# Patient Record
Sex: FEMALE | URBAN - METROPOLITAN AREA
[De-identification: names, ages, dates, MRNs, and addresses within clinical notes are randomized per-mention and may not be internally consistent; named-entity substitution may affect disease eponyms.]

---

## 2017-01-09 ENCOUNTER — OUTPATIENT (OUTPATIENT)
Dept: OUTPATIENT SERVICES | Facility: HOSPITAL | Age: 32
LOS: 1 days | End: 2017-01-09
Payer: COMMERCIAL

## 2017-01-09 VITALS
TEMPERATURE: 99 F | HEART RATE: 79 BPM | DIASTOLIC BLOOD PRESSURE: 86 MMHG | SYSTOLIC BLOOD PRESSURE: 143 MMHG | WEIGHT: 293 LBS | HEIGHT: 63 IN | RESPIRATION RATE: 16 BRPM

## 2017-01-09 DIAGNOSIS — N84.0 POLYP OF CORPUS UTERI: ICD-10-CM

## 2017-01-09 DIAGNOSIS — Z01.818 ENCOUNTER FOR OTHER PREPROCEDURAL EXAMINATION: ICD-10-CM

## 2017-01-09 LAB
ALBUMIN SERPL ELPH-MCNC: 3.8 G/DL — SIGNIFICANT CHANGE UP (ref 3.3–5)
ALP SERPL-CCNC: 63 U/L — SIGNIFICANT CHANGE UP (ref 40–120)
ALT FLD-CCNC: 34 U/L — SIGNIFICANT CHANGE UP (ref 12–78)
ANION GAP SERPL CALC-SCNC: 8 MMOL/L — SIGNIFICANT CHANGE UP (ref 5–17)
AST SERPL-CCNC: 15 U/L — SIGNIFICANT CHANGE UP (ref 15–37)
BILIRUB SERPL-MCNC: 0.2 MG/DL — SIGNIFICANT CHANGE UP (ref 0.2–1.2)
BUN SERPL-MCNC: 13 MG/DL — SIGNIFICANT CHANGE UP (ref 7–23)
CALCIUM SERPL-MCNC: 8.6 MG/DL — SIGNIFICANT CHANGE UP (ref 8.5–10.1)
CHLORIDE SERPL-SCNC: 105 MMOL/L — SIGNIFICANT CHANGE UP (ref 96–108)
CO2 SERPL-SCNC: 27 MMOL/L — SIGNIFICANT CHANGE UP (ref 22–31)
CREAT SERPL-MCNC: 0.79 MG/DL — SIGNIFICANT CHANGE UP (ref 0.5–1.3)
GLUCOSE SERPL-MCNC: 99 MG/DL — SIGNIFICANT CHANGE UP (ref 70–99)
HCG SERPL-ACNC: <1 MIU/ML — SIGNIFICANT CHANGE UP
HCT VFR BLD CALC: 38.3 % — SIGNIFICANT CHANGE UP (ref 34.5–45)
HGB BLD-MCNC: 12.8 G/DL — SIGNIFICANT CHANGE UP (ref 11.5–15.5)
MCHC RBC-ENTMCNC: 28.1 PG — SIGNIFICANT CHANGE UP (ref 27–34)
MCHC RBC-ENTMCNC: 33.5 GM/DL — SIGNIFICANT CHANGE UP (ref 32–36)
MCV RBC AUTO: 83.9 FL — SIGNIFICANT CHANGE UP (ref 80–100)
PLATELET # BLD AUTO: 312 K/UL — SIGNIFICANT CHANGE UP (ref 150–400)
POTASSIUM SERPL-MCNC: 4.2 MMOL/L — SIGNIFICANT CHANGE UP (ref 3.5–5.3)
POTASSIUM SERPL-SCNC: 4.2 MMOL/L — SIGNIFICANT CHANGE UP (ref 3.5–5.3)
PROT SERPL-MCNC: 7.5 G/DL — SIGNIFICANT CHANGE UP (ref 6–8.3)
RBC # BLD: 4.56 M/UL — SIGNIFICANT CHANGE UP (ref 3.8–5.2)
RBC # FLD: 12.5 % — SIGNIFICANT CHANGE UP (ref 10.3–14.5)
SODIUM SERPL-SCNC: 140 MMOL/L — SIGNIFICANT CHANGE UP (ref 135–145)
WBC # BLD: 6.8 K/UL — SIGNIFICANT CHANGE UP (ref 3.8–10.5)
WBC # FLD AUTO: 6.8 K/UL — SIGNIFICANT CHANGE UP (ref 3.8–10.5)

## 2017-01-09 PROCEDURE — 86901 BLOOD TYPING SEROLOGIC RH(D): CPT

## 2017-01-09 PROCEDURE — G0463: CPT

## 2017-01-09 PROCEDURE — 84702 CHORIONIC GONADOTROPIN TEST: CPT

## 2017-01-09 PROCEDURE — 93010 ELECTROCARDIOGRAM REPORT: CPT

## 2017-01-09 PROCEDURE — 80053 COMPREHEN METABOLIC PANEL: CPT

## 2017-01-09 PROCEDURE — 86900 BLOOD TYPING SEROLOGIC ABO: CPT

## 2017-01-09 PROCEDURE — 93005 ELECTROCARDIOGRAM TRACING: CPT

## 2017-01-09 PROCEDURE — 85027 COMPLETE CBC AUTOMATED: CPT

## 2017-01-09 PROCEDURE — 86850 RBC ANTIBODY SCREEN: CPT

## 2017-01-09 RX ORDER — ACETAMINOPHEN 500 MG
975 TABLET ORAL ONCE
Qty: 0 | Refills: 0 | Status: COMPLETED | OUTPATIENT
Start: 2017-01-10 | End: 2017-01-10

## 2017-01-09 RX ORDER — SODIUM CHLORIDE 9 MG/ML
1000 INJECTION, SOLUTION INTRAVENOUS
Qty: 0 | Refills: 0 | Status: DISCONTINUED | OUTPATIENT
Start: 2017-01-10 | End: 2017-01-10

## 2017-01-09 NOTE — H&P PST ADULT - NSANTHOSAYNRD_GEN_A_CORE
No. DEVONTE screening performed.  STOP BANG Legend: 0-2 = LOW Risk; 3-4 = INTERMEDIATE Risk; 5-8 = HIGH Risk

## 2017-01-09 NOTE — H&P PST ADULT - PROBLEM SELECTOR PLAN 1
PST Labs; CBC, CMP, HcG, Type & Screen, EKG - No medical clearance needed - pre-op instructions given to pt with understanding verbalized PST Labs; CBC, CMP, HcG, Type & Screen, EKG - No medical clearance needed - pre-op instructions given to pt with understanding verbalized- pt also instructed to HOLD evening dose of Metformin night prior to procedure as well as to HOLD AM dose of Metformin morning of procedure with understanding verbalized

## 2017-01-09 NOTE — H&P PST ADULT - HISTORY OF PRESENT ILLNESS
31 year old female G0PO presents for PST prior to  Hysteroscopic Polypectomy Using Myosure with Dr Vaughn on 1/10/17 - pt notes she is trying to get pregnant - has been seen by another fertility doctor and notes she went thru 8 rounds of IUI's with no pregancies - pt notes she went for a consult with Dr Vaughn and will be starting IVF next month - pt notes following sonogram Cervical Polyp was diagnosed - pt is electing for scheduled procedure.

## 2017-01-09 NOTE — H&P PST ADULT - ASSESSMENT
31 year old female with Polyp of Corpus Uteri - scheduled for Hysteroscopic Polypectomy Using Myosure with Dr Vaughn on 1/10/17

## 2017-01-10 ENCOUNTER — OUTPATIENT (OUTPATIENT)
Dept: OUTPATIENT SERVICES | Facility: HOSPITAL | Age: 32
LOS: 1 days | Discharge: HOME | End: 2017-01-10
Payer: COMMERCIAL

## 2017-01-10 VITALS
SYSTOLIC BLOOD PRESSURE: 165 MMHG | HEIGHT: 63 IN | HEART RATE: 85 BPM | RESPIRATION RATE: 16 BRPM | TEMPERATURE: 98 F | WEIGHT: 293 LBS | DIASTOLIC BLOOD PRESSURE: 79 MMHG | OXYGEN SATURATION: 100 %

## 2017-01-10 VITALS
DIASTOLIC BLOOD PRESSURE: 91 MMHG | RESPIRATION RATE: 13 BRPM | HEART RATE: 84 BPM | SYSTOLIC BLOOD PRESSURE: 154 MMHG | OXYGEN SATURATION: 100 %

## 2017-01-10 DIAGNOSIS — N84.0 POLYP OF CORPUS UTERI: ICD-10-CM

## 2017-01-10 DIAGNOSIS — Z01.818 ENCOUNTER FOR OTHER PREPROCEDURAL EXAMINATION: ICD-10-CM

## 2017-01-10 PROCEDURE — 88305 TISSUE EXAM BY PATHOLOGIST: CPT

## 2017-01-10 PROCEDURE — 58558 HYSTEROSCOPY BIOPSY: CPT

## 2017-01-10 PROCEDURE — 88305 TISSUE EXAM BY PATHOLOGIST: CPT | Mod: 26

## 2017-01-10 RX ORDER — METFORMIN HYDROCHLORIDE 850 MG/1
2 TABLET ORAL
Qty: 0 | Refills: 0 | COMMUNITY

## 2017-01-10 RX ORDER — SODIUM CHLORIDE 9 MG/ML
1000 INJECTION, SOLUTION INTRAVENOUS
Qty: 0 | Refills: 0 | Status: DISCONTINUED | OUTPATIENT
Start: 2017-01-10 | End: 2017-01-10

## 2017-01-10 RX ORDER — ONDANSETRON 8 MG/1
4 TABLET, FILM COATED ORAL ONCE
Qty: 0 | Refills: 0 | Status: DISCONTINUED | OUTPATIENT
Start: 2017-01-10 | End: 2017-01-10

## 2017-01-10 RX ORDER — LEVOTHYROXINE SODIUM 125 MCG
1 TABLET ORAL
Qty: 0 | Refills: 0 | COMMUNITY

## 2017-01-10 RX ORDER — ALBUTEROL 90 UG/1
2 AEROSOL, METERED ORAL
Qty: 0 | Refills: 0 | COMMUNITY

## 2017-01-10 RX ORDER — HYDROMORPHONE HYDROCHLORIDE 2 MG/ML
0.25 INJECTION INTRAMUSCULAR; INTRAVENOUS; SUBCUTANEOUS
Qty: 0 | Refills: 0 | Status: DISCONTINUED | OUTPATIENT
Start: 2017-01-10 | End: 2017-01-10

## 2017-01-10 RX ADMIN — SODIUM CHLORIDE 30 MILLILITER(S): 9 INJECTION, SOLUTION INTRAVENOUS at 12:11

## 2017-01-10 RX ADMIN — Medication 975 MILLIGRAM(S): at 12:11

## 2017-01-10 NOTE — ASU DISCHARGE PLAN (ADULT/PEDIATRIC). - FOLLOWUP APPOINTMENT CLINIC/PHYSICIAN
Return to [place] office in 1 week for post-op check and review of Pathology findings: [phone]

## 2017-01-10 NOTE — ASU DISCHARGE PLAN (ADULT/PEDIATRIC). - NOTIFY
Pain not relieved by Medications/Persistent Nausea and Vomiting/Bleeding that does not stop/Unable to Urinate/GYN Fever>100.4/Inability to Tolerate Liquids or Foods

## 2017-01-12 LAB — SURGICAL PATHOLOGY FINAL REPORT - CH: SIGNIFICANT CHANGE UP

## 2017-01-16 DIAGNOSIS — N84.0 POLYP OF CORPUS UTERI: ICD-10-CM

## 2017-01-16 DIAGNOSIS — I87.8 OTHER SPECIFIED DISORDERS OF VEINS: ICD-10-CM

## 2017-01-16 DIAGNOSIS — E66.01 MORBID (SEVERE) OBESITY DUE TO EXCESS CALORIES: ICD-10-CM

## 2017-01-16 DIAGNOSIS — F17.210 NICOTINE DEPENDENCE, CIGARETTES, UNCOMPLICATED: ICD-10-CM

## 2017-01-16 DIAGNOSIS — E28.2 POLYCYSTIC OVARIAN SYNDROME: ICD-10-CM

## 2017-09-26 ENCOUNTER — TRANSCRIPTION ENCOUNTER (OUTPATIENT)
Age: 32
End: 2017-09-26

## 2017-11-28 ENCOUNTER — TRANSCRIPTION ENCOUNTER (OUTPATIENT)
Age: 32
End: 2017-11-28

## 2020-02-10 ENCOUNTER — TRANSCRIPTION ENCOUNTER (OUTPATIENT)
Age: 35
End: 2020-02-10

## 2022-01-26 NOTE — BRIEF OPERATIVE NOTE - SPECIMENS
Myosure curettings Blood Cx (1/11) grew serratia- on IV Zosyn- continue for 4 weeks  CT A/P showing splenic abscess 4cm in size - see section below for management

## 2022-06-28 ENCOUNTER — RESULT REVIEW (OUTPATIENT)
Age: 37
End: 2022-06-28

## 2022-06-28 ENCOUNTER — INPATIENT (INPATIENT)
Facility: HOSPITAL | Age: 37
LOS: 1 days | Discharge: HOME | DRG: 189 | End: 2022-06-30
Attending: GENERAL ACUTE CARE HOSPITAL | Admitting: GENERAL ACUTE CARE HOSPITAL
Payer: MEDICAID

## 2022-06-28 VITALS
TEMPERATURE: 100 F | SYSTOLIC BLOOD PRESSURE: 130 MMHG | OXYGEN SATURATION: 91 % | HEART RATE: 103 BPM | RESPIRATION RATE: 20 BRPM | WEIGHT: 293 LBS | HEIGHT: 64 IN | DIASTOLIC BLOOD PRESSURE: 115 MMHG

## 2022-06-28 DIAGNOSIS — J90 PLEURAL EFFUSION, NOT ELSEWHERE CLASSIFIED: ICD-10-CM

## 2022-06-28 DIAGNOSIS — C50.919 MALIGNANT NEOPLASM OF UNSPECIFIED SITE OF UNSPECIFIED FEMALE BREAST: ICD-10-CM

## 2022-06-28 LAB
ALBUMIN SERPL ELPH-MCNC: 3.5 G/DL — SIGNIFICANT CHANGE UP (ref 3.3–5.2)
ALP SERPL-CCNC: 74 U/L — SIGNIFICANT CHANGE UP (ref 40–120)
ALT FLD-CCNC: 36 U/L — HIGH
ANION GAP SERPL CALC-SCNC: 10 MMOL/L — SIGNIFICANT CHANGE UP (ref 5–17)
APTT BLD: 29.3 SEC — SIGNIFICANT CHANGE UP (ref 27.5–35.5)
AST SERPL-CCNC: 27 U/L — SIGNIFICANT CHANGE UP
B PERT IGG+IGM PNL SER: ABNORMAL
BASOPHILS # BLD AUTO: 0.06 K/UL — SIGNIFICANT CHANGE UP (ref 0–0.2)
BASOPHILS NFR BLD AUTO: 0.6 % — SIGNIFICANT CHANGE UP (ref 0–2)
BILIRUB SERPL-MCNC: <0.2 MG/DL — LOW (ref 0.4–2)
BLD GP AB SCN SERPL QL: SIGNIFICANT CHANGE UP
BUN SERPL-MCNC: 14.4 MG/DL — SIGNIFICANT CHANGE UP (ref 8–20)
CALCIUM SERPL-MCNC: 9 MG/DL — SIGNIFICANT CHANGE UP (ref 8.6–10.2)
CHLORIDE SERPL-SCNC: 100 MMOL/L — SIGNIFICANT CHANGE UP (ref 98–107)
CO2 SERPL-SCNC: 28 MMOL/L — SIGNIFICANT CHANGE UP (ref 22–29)
COLOR FLD: ABNORMAL
CREAT SERPL-MCNC: 0.8 MG/DL — SIGNIFICANT CHANGE UP (ref 0.5–1.3)
EGFR: 97 ML/MIN/1.73M2 — SIGNIFICANT CHANGE UP
EOSINOPHIL # BLD AUTO: 0.07 K/UL — SIGNIFICANT CHANGE UP (ref 0–0.5)
EOSINOPHIL NFR BLD AUTO: 0.7 % — SIGNIFICANT CHANGE UP (ref 0–6)
FLUAV AG NPH QL: SIGNIFICANT CHANGE UP
FLUBV AG NPH QL: SIGNIFICANT CHANGE UP
FLUID INTAKE SUBSTANCE CLASS: SIGNIFICANT CHANGE UP
GLUCOSE SERPL-MCNC: 104 MG/DL — HIGH (ref 70–99)
GRAM STN FLD: SIGNIFICANT CHANGE UP
HCT VFR BLD CALC: 35.2 % — SIGNIFICANT CHANGE UP (ref 34.5–45)
HGB BLD-MCNC: 10.6 G/DL — LOW (ref 11.5–15.5)
IMM GRANULOCYTES NFR BLD AUTO: 0.6 % — SIGNIFICANT CHANGE UP (ref 0–1.5)
INR BLD: 1.14 RATIO — SIGNIFICANT CHANGE UP (ref 0.88–1.16)
LYMPHOCYTES # BLD AUTO: 0.73 K/UL — LOW (ref 1–3.3)
LYMPHOCYTES # BLD AUTO: 7.1 % — LOW (ref 13–44)
LYMPHOCYTES # FLD: 13 % — SIGNIFICANT CHANGE UP
MCHC RBC-ENTMCNC: 25.8 PG — LOW (ref 27–34)
MCHC RBC-ENTMCNC: 30.1 GM/DL — LOW (ref 32–36)
MCV RBC AUTO: 85.6 FL — SIGNIFICANT CHANGE UP (ref 80–100)
MONOCYTES # BLD AUTO: 0.6 K/UL — SIGNIFICANT CHANGE UP (ref 0–0.9)
MONOCYTES NFR BLD AUTO: 5.8 % — SIGNIFICANT CHANGE UP (ref 2–14)
MONOS+MACROS # FLD: 5 % — SIGNIFICANT CHANGE UP
NEUTROPHILS # BLD AUTO: 8.82 K/UL — HIGH (ref 1.8–7.4)
NEUTROPHILS NFR BLD AUTO: 85.2 % — HIGH (ref 43–77)
NEUTROPHILS-BODY FLUID: 82 % — SIGNIFICANT CHANGE UP
PH FLD: 8 — SIGNIFICANT CHANGE UP
PLATELET # BLD AUTO: 484 K/UL — HIGH (ref 150–400)
POTASSIUM SERPL-MCNC: 3.9 MMOL/L — SIGNIFICANT CHANGE UP (ref 3.5–5.3)
POTASSIUM SERPL-SCNC: 3.9 MMOL/L — SIGNIFICANT CHANGE UP (ref 3.5–5.3)
PROT SERPL-MCNC: 7 G/DL — SIGNIFICANT CHANGE UP (ref 6.6–8.7)
PROTHROM AB SERPL-ACNC: 13.2 SEC — SIGNIFICANT CHANGE UP (ref 10.5–13.4)
RBC # BLD: 4.11 M/UL — SIGNIFICANT CHANGE UP (ref 3.8–5.2)
RBC # FLD: 14.5 % — SIGNIFICANT CHANGE UP (ref 10.3–14.5)
RCV VOL RI: HIGH /UL (ref 0–0)
RSV RNA NPH QL NAA+NON-PROBE: SIGNIFICANT CHANGE UP
SARS-COV-2 RNA SPEC QL NAA+PROBE: SIGNIFICANT CHANGE UP
SODIUM SERPL-SCNC: 138 MMOL/L — SIGNIFICANT CHANGE UP (ref 135–145)
SPECIMEN SOURCE: SIGNIFICANT CHANGE UP
TOTAL NUCLEATED CELL COUNT, BODY FLUID: 131 /UL — SIGNIFICANT CHANGE UP
TUBE TYPE: SIGNIFICANT CHANGE UP
WBC # BLD: 10.34 K/UL — SIGNIFICANT CHANGE UP (ref 3.8–10.5)
WBC # FLD AUTO: 10.34 K/UL — SIGNIFICANT CHANGE UP (ref 3.8–10.5)

## 2022-06-28 PROCEDURE — 71045 X-RAY EXAM CHEST 1 VIEW: CPT | Mod: 26

## 2022-06-28 PROCEDURE — 88305 TISSUE EXAM BY PATHOLOGIST: CPT | Mod: 26

## 2022-06-28 PROCEDURE — 99285 EMERGENCY DEPT VISIT HI MDM: CPT

## 2022-06-28 PROCEDURE — 88342 IMHCHEM/IMCYTCHM 1ST ANTB: CPT | Mod: 26

## 2022-06-28 PROCEDURE — 88360 TUMOR IMMUNOHISTOCHEM/MANUAL: CPT | Mod: 26

## 2022-06-28 PROCEDURE — 93010 ELECTROCARDIOGRAM REPORT: CPT

## 2022-06-28 PROCEDURE — 99223 1ST HOSP IP/OBS HIGH 75: CPT

## 2022-06-28 PROCEDURE — 88341 IMHCHEM/IMCYTCHM EA ADD ANTB: CPT | Mod: 26

## 2022-06-28 PROCEDURE — 88112 CYTOPATH CELL ENHANCE TECH: CPT | Mod: 26

## 2022-06-28 PROCEDURE — 71045 X-RAY EXAM CHEST 1 VIEW: CPT | Mod: 26,77

## 2022-06-28 RX ORDER — MORPHINE SULFATE 50 MG/1
2 CAPSULE, EXTENDED RELEASE ORAL ONCE
Refills: 0 | Status: DISCONTINUED | OUTPATIENT
Start: 2022-06-28 | End: 2022-06-28

## 2022-06-28 RX ADMIN — MORPHINE SULFATE 2 MILLIGRAM(S): 50 CAPSULE, EXTENDED RELEASE ORAL at 21:03

## 2022-06-28 RX ADMIN — MORPHINE SULFATE 2 MILLIGRAM(S): 50 CAPSULE, EXTENDED RELEASE ORAL at 22:05

## 2022-06-28 NOTE — ED ADULT NURSE NOTE - NSSUHOSCREENINGYN_ED_ALL_ED
[FreeTextEntry1] : 62 year old female smoker w h/o sherry, hl, htn, dm, micoalbuminuria, obesity, asthma, gerd, cad s/p DERIK mRCA presents for initial metabolic evaluation.\par Generally feels well and endorses no acute complaints.\par Reports DM2 diagnosis was made many years ago by former PCP. She notes however that she was started on insulin after PCI in 9/2018. At that time, A1C was 11. She has been adherent to Lantus 12 units QHS but self d/lynne humalog 8 units. She is compliant w/ Metformin 500 mg BID, denies GI s/e.\par \par 3/2019: Here for /fu, generally feels well and endorses no acute complaints. No interval events since LV. Today reports she developed HAs w/ recently started amlodipine. Also reports diarrhea w/ atorvastatin. has stopped both. presently denies loose stools, HAs, vision changes. Reports euglycemia at home, is tolerating 4 tabs of metformin 500 mg w/o GI s/e. No hypoglycemia. Taking lantus 8 units as instructed QD\par She otherwise denies any f/c, CP, SOB, palpitations, tremors, depressed mood, anxiety, palpitations, n/v, stool/urinary abn, skin/weight changes, heat/cold intolerance, HAs, breast/nipple changes, polyuria/polydipsia/nocturia or other complaints.\par Off note, she is an active smoker w/ >50 ppy, currently smokes 4 cigs daily. Yes - the patient is able to be screened

## 2022-06-28 NOTE — ED PROVIDER NOTE - ATTENDING CONTRIBUTION TO CARE
The patient seen and examined    Pleural Effusion    I, Boby Chambers, performed the initial face to face bedside interview with this patient regarding history of present illness, review of symptoms and relevant past medical, social and family history.  I completed an independent physical examination.  I was the initial provider who evaluated this patient. I have signed out the follow up of any pending tests (i.e. labs, radiological studies) to the resident.  I have communicated the patient’s plan of care and disposition with the resident

## 2022-06-28 NOTE — ED PROVIDER NOTE - PHYSICAL EXAMINATION
General: mild distress  Head: NC, AT  EENT: no scleral icterus  Cardiac: RRR, no apparent murmurs, no lower extremity edema  Respiratory: CTABL, mild respiratory distress   Abdomen: soft, ND, NT, nonperitonitic  MSK/Vascular: soft compartments, warm extremities  Neuro: AAOx3, sensation to light touch intact  Psych: calm, cooperative

## 2022-06-28 NOTE — ED ADULT NURSE REASSESSMENT NOTE - NS ED NURSE REASSESS COMMENT FT1
Chest tube canister changed and unclamped. PT tolerated about 15minutes before violent coughing.  450cc serosang drainage into canister.  Chest tub clamped and receiving nurse made aware.

## 2022-06-28 NOTE — CONSULT NOTE ADULT - PROBLEM SELECTOR RECOMMENDATION 9
Large right pleural effusion on CXR and also noted on anger CT chest that was uploaded into system.  Pt SOB requiring nasal O2.   Plan for right pigtail insertion.  CXR to follow.   Will send pleural fluid for cytology and lytes  Maintain tube to waterseal.  CXR in AM.  Will follow.   Discussed with Dr. Gill.

## 2022-06-28 NOTE — PROCEDURE NOTE - NSPROCDETAILS_GEN_ALL_CORE
Seldinger technique/dressing applied/secured in place/sterile dressing applied/supine position/percutaneous/thoracostomy tube placed percutaneously/ultrasound assessment of fluid (location)

## 2022-06-28 NOTE — ED PROVIDER NOTE - OBJECTIVE STATEMENT
38 y/o F with PMHx recently diagnosed R breast cancer not yet on treatment who presents to the ED c/o shortness of breath worsening over the past week. Patient states that she had an outpatient CT scan today which showed that she had R sided pleural effusion leading to collapse of the R lung. States that her R arm has also been more painful and swollen and she had a negative DVT duplex study recently. Per paperwork patient brings with her, CT scan was negative for PE in large vessels. Disc was given to radiology for upload to PACS here. Denies any fevers, chills, chest pain, NVD, abdominal pain, or dysuria. Patient arrives here sating in the high 86-88% on RA improving to 99% on 4 L NC.

## 2022-06-28 NOTE — ED PROVIDER NOTE - PROGRESS NOTE DETAILS
Seen by CT surgery who placed pigtail with 1 L of serosanguinous output after insertion. Fluid was sent for cytology. CT recommending medicine admission and will follow patient. Plan to admit. - Koko

## 2022-06-28 NOTE — PATIENT PROFILE ADULT - FALL HARM RISK - HARM RISK INTERVENTIONS

## 2022-06-28 NOTE — ED PROVIDER NOTE - NS ED ROS FT
Constitutional: no fever, no chills  Head: NC, AT   Eyes: no redness   ENMT: no nasal congestion/drainage, no sore throat   CV: no chest pain, + edema  Resp: no cough, + dyspnea  GI: no abdominal pain, no nausea, no vomiting, no diarrhea  : no dysuria, no hematuria   Skin: no lesions, no rashes   Neuro: no LOC, no headache, no sensory deficits, no weakness

## 2022-06-28 NOTE — ED ADULT NURSE NOTE - NEURO SENSATION
ADULT SWALLOWING EVALUATION    ASSESSMENT    ASSESSMENT/OVERALL IMPRESSION:  Orders rec'd, chart reviewed. SLP collaborated with RN prior to entering room.  RN reported patient with increased alertness with some verbal expression noted during MD interaction Diet Recommendations - Solids: Puree  Diet Recommendations - Liquid: Nectar thick    Compensatory Strategies Recommended: Slow rate; Liquids via spoon; No straws; Alternate consistencies;Small bites and sips;Multiple swallows  Aspiration Precautions: Tiny Bess atherosclerotic aorta. 2. Bilateral mixed alveolar and interstitial opacification in the perihilar and basilar regions has developed. Suspect multifocal pneumonitis.     OBJECTIVE   ORAL MOTOR EXAMINATION  Dentition: Functional  Symmetry: Unable to assess Alternate liquids/solids, No straws, Upright 90 degrees, Eliminate distractions, Feed patient with moderate assistance 100 % of the time across 2-3 sessions.     In Progress     FOLLOW UP  Treatment Plan/Recommendations: Aspiration precautions  Number of Vi sensory intact

## 2022-06-28 NOTE — H&P ADULT - NSHPPHYSICALEXAM_GEN_ALL_CORE
Vital Signs Last 24 Hrs  T(C): 36.9 (29 Jun 2022 07:32), Max: 37.5 (28 Jun 2022 14:54)  T(F): 98.4 (29 Jun 2022 07:32), Max: 99.5 (28 Jun 2022 14:54)  HR: 90 (29 Jun 2022 07:32) (90 - 105)  BP: 129/79 (29 Jun 2022 07:32) (129/79 - 151/89)  BP(mean): --  RR: 20 (29 Jun 2022 07:32) (20 - 20)  SpO2: 91% (29 Jun 2022 07:32) (85% - 96%)    GENERAL:  Well-appearing, not in acute distress  EYES:  Clear conjunctiva, extraocular movement intact  ENT: Moist mucous membranes  RESP:  Non-labored breathing pattern, diminished breath sounds in R-side, s/p R-chest tube   CV: Regular rate and rhythm, no murmurs appreciated, no lower extremity edema  GI: Soft, non-tender, non-distended  NEURO: Awake, alert, conversant, upper and lower extremity strength 5/5, light touch sensation grossly intact  PSYCH: Calm, cooperative  SKIN: No rash or lesions, warm and dry

## 2022-06-28 NOTE — ED ADULT NURSE NOTE - OBJECTIVE STATEMENT
37 yr old female sent from oncologist with right sided pneumothorax.   pt presents with swelling of right arm and sob.  pt denies lightheadedness, dizziness, cp, nausea, vomiting, diarrhea, abdominal pain.  pt has redness and discharge of right breast, was recently diagnosed with breast cancer and had a biopsy.  pt has 20 to LAC from cancer center.  mother at bedside 37 yr old female sent from oncologist with right sided pneumothorax.   pt presents with swelling of right arm and sob x1 week.  pt denies lightheadedness, dizziness, cp, nausea, vomiting, diarrhea, abdominal pain.  pt has redness and discharge of right breast, had biopsy 2 days ago and is awaiting results.  pt has 20 to LAC from cancer center.  mother at bedside 37 yr old female sent from oncologist with right sided pleural effusion and collapsed lung.  pt had outpatient CT.   pt presents with swelling of right arm and sob x1 week.  pt denies lightheadedness, dizziness, cp, nausea, vomiting, diarrhea, abdominal pain.  pt has redness and discharge of right breast, had biopsy 2 days ago and is awaiting results.  pt has 20 to LAC from cancer center.  mother at bedside

## 2022-06-28 NOTE — ED PROVIDER NOTE - CLINICAL SUMMARY MEDICAL DECISION MAKING FREE TEXT BOX
38 y/o F with PMHx recently diagnosed R breast cancer not yet on treatment who presents to the ED c/o shortness of breath worsening over the past week. Labs, cxr, CT surg consult, reassess.

## 2022-06-28 NOTE — H&P ADULT - NSHPLABSRESULTS_GEN_ALL_CORE
10.6   10.34 )-----------( 484      ( 28 Jun 2022 15:39 )             35.2       06-28    138  |  100  |  14.4  ----------------------------<  104<H>  3.9   |  28.0  |  0.80    Ca    9.0      28 Jun 2022 15:39    TPro  7.0  /  Alb  3.5  /  TBili  <0.2<L>  /  DBili  x   /  AST  27  /  ALT  36<H>  /  AlkPhos  74  06-28

## 2022-06-28 NOTE — PROCEDURE NOTE - ADDITIONAL PROCEDURE DETAILS
Patient tolerated procedure well.  Clamped after 1 liter. Will unclamp and continue to drain in 1 hour from insertion time

## 2022-06-28 NOTE — ED ADULT NURSE REASSESSMENT NOTE - NS ED NURSE REASSESS COMMENT FT1
PT tolerated placement of right sided pigtail chest tube.  Initially drained out 1100.  Per CT surg PA Angie clamp for 1 hour and reopen.  VSS at this time. Site clean dry and intact

## 2022-06-28 NOTE — ED ADULT TRIAGE NOTE - CHIEF COMPLAINT QUOTE
pt states she has a right sided pneumothorax, c/o SOB, having treatments for breast cancer  A&Ox3, resp wnl, starting treatments for breast cancer, IV to LAC

## 2022-06-28 NOTE — CONSULT NOTE ADULT - SUBJECTIVE AND OBJECTIVE BOX
Surgeon: Jairo    Consult requesting by: ER    HISTORY OF PRESENT ILLNESS:  37y Female with no PMH. Current 1/2-1ppd smoker x 15 years.  Diagnosis of breast CA this week> had a biopsy yesterday awaiting pathology for tissue diagnosis.  Follows with Dr. Esparza for oncology.  Reports that she has been having worsening SOB and right arm swelling over the last week.  She had an outpatient CT chest at [facility] today and was sent to the ER by her oncologist for a large right pleural effusion.  Pt desaturating on arrival to ED requiring nasal O2.  CXR with right lung white out.  Called to evaluate for pigtail.     Pt sitting up on stretcher in ER.  Reports mild SOB at rest.  On nasal O2.  Denies CP.  NAD noted.    PAST MEDICAL & SURGICAL HISTORY:  Breast cancer      MEDICATIONS  (STANDING):    MEDICATIONS  (PRN):    Antiplatelet therapy:                           Last dose/amt:    Allergies    No Known Allergies    Intolerances        SOCIAL HISTORY:  Smoker: [ x] Yes  [ ] No    1/2-1ppd smoker x 15 years.      ETOH use: [ ] Yes  [x ] No              FREQUENCY / QUANTITY:  Ilicit Drug use:  [ ] Yes  [ x] No  Live with:   Assisted device use: denies use    FAMILY HISTORY:      Review of Systems  CONSTITUTIONAL:  Fevers[ ] chills[ ] sweats[ ] fatigue[ ] weight loss[ ] weight gain [ ]                                     NEGATIVE [x ]   NEURO:  parathesias[ ] seizures [ ]  syncope [ ]  confusion [ ]                                                                                NEGATIVE[x ]   EYES: glasses[ ]  blurry vision[ ]  discharge[ ] pain[ ] glaucoma [ ]                                                                          NEGATIVE[ ]   ENMT:  difficulty hearing [ ]  vertigo[ ]  dysphagia[ ] epistaxis[ ] recent dental work [ ]                                    NEGATIVE[ x]   CV:  chest pain[ ] palpitations[ ] CANTU [ ] diaphoresis [ ]                                                                                           NEGATIVE[x ]   RESPIRATORY:  wheezing[ ] SOB[x ] cough [ ] sputum[ ] hemoptysis[ ]                                                                  NEGATIVE[ ]   GI:  nausea[ ]  vommiting [ ]  diarrhea[ ] constipation [ ] melena [ ]                                                                      NEGATIVE[x ]   : hematuria[ ]  dysuria[ ] urgency[ ] incontinence[ ]                                                                                            NEGATIVE[ x]   MUSKULOSKELETAL:  arthritis[ ]  joint swelling [ ] muscle weakness [ ]   right arm swelling                                           NEGATIVE[ ]   SKIN/BREAST:  rash[x ] itching [ ]  hair loss[ ] masses[ ]                                                                                              NEGATIVE[ ]   PSYCH:  dementia [ ] depression [ ] anxiety[ ]                                                                                                               NEGATIVE[ ]   HEME/LYMPH:  bruises easily[ ] enlarged lymph nodes[ ] tender lymph nodes[ ]                                               NEGATIVE[x ]   ENDOCRINE:  cold intolerance[ ] heat intolerance[ ] polydipsia[ ]                                                                          NEGATIVE[x ]     PHYSICAL EXAM  Vital Signs Last 24 Hrs  T(C): 37.5 (28 Jun 2022 14:54), Max: 37.5 (28 Jun 2022 14:54)  T(F): 99.5 (28 Jun 2022 14:54), Max: 99.5 (28 Jun 2022 14:54)  HR: 103 (28 Jun 2022 14:54) (103 - 103)  BP: 130/115 (28 Jun 2022 14:54) (130/115 - 130/115)  BP(mean): --  RR: 20 (28 Jun 2022 14:54) (20 - 20)  SpO2: 95% (28 Jun 2022 15:12) (85% - 95%)    CONSTITUTIONAL:                                                                            Neuro: WNL[x ] Normal exam oriented to person/place & time with no focal motor or sensory  deficits. Other                     Eyes: WNL[x ]   Normal exam of conjunctiva & lids, pupils equally reactive. Other     ENT: WNL[x ]    Normal exam of nasal/oral mucosa with absence of cyanosis. Other  Neck: WNL[x ]  Normal exam of jugular veins, trachea & thyroid. Other  Chest: WNL[ ] Normal lung exam with good air movement absence of wheezes, rales, or rhonchi: Other DIminished on right.                                                                                  CV:  Auscultation: normal [ x] S3[ ] S4[ ] Irregular [ ] Rub[ ] Clicks[ ]    Murmurs none:[x ]systolic [ ]  diastolic [ ] holosystolic [ ]  Carotids: No Bruits[x ] Other                                                                                                 GI:           WNL[x ] Normal exam of abdomen, liver & spleen with no noted masses or tenderness. Other                                                                                                        Extremities: WNL[ ] Normal no evidence of cyanosis or deformity Edema: none[ ]trace[ ]1+[ ]2+[ ]3+[ ]4+[ ] **Moderate Right arm swelling.  Trace Bilateral ankle swelling.  Lower Extremity Pulses: Right[pp ] Left[pp}  SKIN :WNL[ ] Normal exam to inspection & palpation. Other:  Right breast rash/skin changes.                                                          LABS:                        10.6   10.34 )-----------( 484      ( 28 Jun 2022 15:39 )             35.2     06-28    138  |  100  |  14.4  ----------------------------<  104<H>  3.9   |  28.0  |  0.80    Ca    9.0      28 Jun 2022 15:39    TPro  7.0  /  Alb  3.5  /  TBili  <0.2<L>  /  DBili  x   /  AST  27  /  ALT  36<H>  /  AlkPhos  74  06-28    PT/INR - ( 28 Jun 2022 15:39 )   PT: 13.2 sec;   INR: 1.14 ratio         PTT - ( 28 Jun 2022 15:39 )  PTT:29.3 sec

## 2022-06-28 NOTE — CONSULT NOTE ADULT - ASSESSMENT
37y Female with no PMH. Current 1/2-1ppd smoker x 15 years.  Diagnosis of breast CA this week> had a biopsy yesterday awaiting pathology for tissue diagnosis.  Follows with Dr. Esparza for oncology.  Reports that she has been having worsening SOB and right arm swelling over the last week.  She had an outpatient CT chest at [facility] today and was sent to the ER by her oncologist for a large right pleural effusion.  Pt desaturating on arrival to ED requiring nasal O2.  CXR with right lung white out.  Called to evaluate for pigtail.

## 2022-06-28 NOTE — H&P ADULT - HISTORY OF PRESENT ILLNESS
37yoF hx mild intermittent asthma, recently diagnosed R-breast CA on 6/24/22, pending staging and treatment plan, who was sent to hospital by her oncologist after outpatient imaging revealed a new pleural effusion.  Pt reports 1 week hx of progressive exertional dyspnea associated with R-arm swelling.  She also reports a non-productive cough.  Pt was at her oncologist office earlier today establishing care as a new patient and was sent to hospital based on above imaging results.  Pt denies any fevers, chills or chest pain.  Pt arrived to [facility] satting 86-88% on room air and was placed on nasal cannula with improvement.  Admission CXR showed total opacification of the right hemithorax consistent with large pleural effusion without mediastinal shift and extensive asymmetrical swelling of the right upper extremity compared to the left side, which could indicate lymphedema.

## 2022-06-28 NOTE — H&P ADULT - ASSESSMENT
37yoF hx mild intermittent asthma, recently diagnosed R-breast CA on 6/24/22, pending staging and treatment plan, presenting 1week hx of exertional dyspnea who was sent to hospital after outpatient imaging revealed a new pleural effusion     Acute hypoxemic respiratory failure due to right sided pleural effusion  -Likely malignant effusion from breast CA  -Seen by CT surgery, s/p chest tube placement  -Pain control with Tylenol, Percocet, Tramadol PRN  -Pt stating that IV morphine was too powerful  - monitoring  -Wean supplemental O2 as tolerated  -Follow up with CT regarding CT chest removal    RUE swelling  -Likely due to lymphedema from pleural effusion    Hx of breast CA  -Recent diagnosis based on recent pathology, has not been staged or started   Hx mild intermittent asthma  -On albuterol PRN, will hold of given chest tube placement    Hx smoker  -Declined nicotine patch    Prophylactic measure  -Intermittent pneumatic compression  -No pharmacological AC given chest tube placement and risk of hemothorax

## 2022-06-29 DIAGNOSIS — Z98.890 OTHER SPECIFIED POSTPROCEDURAL STATES: Chronic | ICD-10-CM

## 2022-06-29 LAB
ALBUMIN FLD-MCNC: 2.6 G/DL — SIGNIFICANT CHANGE UP
GLUCOSE FLD-MCNC: 117 MG/DL — SIGNIFICANT CHANGE UP
HCT VFR BLD CALC: 35.3 % — SIGNIFICANT CHANGE UP (ref 34.5–45)
HGB BLD-MCNC: 10.7 G/DL — LOW (ref 11.5–15.5)
LDH SERPL L TO P-CCNC: 350 U/L — HIGH (ref 98–192)
LDH SERPL L TO P-CCNC: 394 U/L — SIGNIFICANT CHANGE UP
MCHC RBC-ENTMCNC: 26.1 PG — LOW (ref 27–34)
MCHC RBC-ENTMCNC: 30.3 GM/DL — LOW (ref 32–36)
MCV RBC AUTO: 86.1 FL — SIGNIFICANT CHANGE UP (ref 80–100)
PLATELET # BLD AUTO: 416 K/UL — HIGH (ref 150–400)
PROT FLD-MCNC: 3.8 G/DL — SIGNIFICANT CHANGE UP
RBC # BLD: 4.1 M/UL — SIGNIFICANT CHANGE UP (ref 3.8–5.2)
RBC # FLD: 14.4 % — SIGNIFICANT CHANGE UP (ref 10.3–14.5)
WBC # BLD: 10.66 K/UL — HIGH (ref 3.8–10.5)
WBC # FLD AUTO: 10.66 K/UL — HIGH (ref 3.8–10.5)

## 2022-06-29 PROCEDURE — 99232 SBSQ HOSP IP/OBS MODERATE 35: CPT

## 2022-06-29 PROCEDURE — 99233 SBSQ HOSP IP/OBS HIGH 50: CPT

## 2022-06-29 PROCEDURE — 71045 X-RAY EXAM CHEST 1 VIEW: CPT | Mod: 26

## 2022-06-29 PROCEDURE — 71250 CT THORAX DX C-: CPT | Mod: 26

## 2022-06-29 RX ORDER — ALPRAZOLAM 0.25 MG
0.25 TABLET ORAL ONCE
Refills: 0 | Status: DISCONTINUED | OUTPATIENT
Start: 2022-06-29 | End: 2022-06-29

## 2022-06-29 RX ORDER — ONDANSETRON 8 MG/1
4 TABLET, FILM COATED ORAL EVERY 6 HOURS
Refills: 0 | Status: DISCONTINUED | OUTPATIENT
Start: 2022-06-29 | End: 2022-06-30

## 2022-06-29 RX ORDER — ACETAMINOPHEN 500 MG
650 TABLET ORAL EVERY 6 HOURS
Refills: 0 | Status: DISCONTINUED | OUTPATIENT
Start: 2022-06-29 | End: 2022-06-30

## 2022-06-29 RX ORDER — OXYCODONE AND ACETAMINOPHEN 5; 325 MG/1; MG/1
1 TABLET ORAL EVERY 6 HOURS
Refills: 0 | Status: DISCONTINUED | OUTPATIENT
Start: 2022-06-29 | End: 2022-06-30

## 2022-06-29 RX ORDER — TRAMADOL HYDROCHLORIDE 50 MG/1
50 TABLET ORAL EVERY 8 HOURS
Refills: 0 | Status: DISCONTINUED | OUTPATIENT
Start: 2022-06-29 | End: 2022-06-30

## 2022-06-29 RX ADMIN — TRAMADOL HYDROCHLORIDE 50 MILLIGRAM(S): 50 TABLET ORAL at 23:14

## 2022-06-29 RX ADMIN — Medication 100 MILLIGRAM(S): at 17:34

## 2022-06-29 RX ADMIN — Medication 100 MILLIGRAM(S): at 04:48

## 2022-06-29 RX ADMIN — Medication 0.25 MILLIGRAM(S): at 23:44

## 2022-06-29 RX ADMIN — Medication 100 MILLIGRAM(S): at 10:51

## 2022-06-29 RX ADMIN — TRAMADOL HYDROCHLORIDE 50 MILLIGRAM(S): 50 TABLET ORAL at 04:31

## 2022-06-29 RX ADMIN — OXYCODONE AND ACETAMINOPHEN 1 TABLET(S): 5; 325 TABLET ORAL at 11:31

## 2022-06-29 RX ADMIN — OXYCODONE AND ACETAMINOPHEN 1 TABLET(S): 5; 325 TABLET ORAL at 12:31

## 2022-06-29 RX ADMIN — TRAMADOL HYDROCHLORIDE 50 MILLIGRAM(S): 50 TABLET ORAL at 05:54

## 2022-06-29 NOTE — PROGRESS NOTE ADULT - ASSESSMENT
Patient is a 37 year old female with PMH of mild intermittent asthma, recently diagnosed R-breast CA on 6/24/22, pending staging and treatment plan, presenting 1week hx of exertional dyspnea who was sent to hospital after outpatient imaging revealed a new pleural effusion     Acute hypoxemic respiratory failure due to right sided pleural effusion  -Likely malignant effusion from breast CA  -remains hypoxic but down to 2 liters today  -s/p chest tube placement; monitor output  -Analgesia as needed  - monitoring  -Wean supplemental O2 as tolerated  -CT surgery requesting repeat CT chest today  -Daily CXR  -f/u further CTS recommendations    RUE swelling  -Likely due lymphangitic spread from breast CA  -denies paresthesias, claudication or any other significant complaints    Right Breast Mass due to Breast CA  -Recent diagnosis based on recent pathology, has not been staged or started treatment  -hem/onc consult appreciated    History of Asthma  -not in acute decompensation    Hx smoker  -Declined nicotine patch    Leukocytosis likely reactive  -monitor WBC counts  -repeat CBC  -monitor off abx    DVT ppx - SCDS    Dispo - Repeat CT chest. Maintain chest tube.     Plan discussed with patient, CT surgery PA, RN
37y Female with no PMH. Current 1/2-1ppd smoker x 15 years.  Diagnosis of breast CA this week> had a biopsy yesterday awaiting pathology for tissue diagnosis.  Follows with Dr. Esparza for oncology.  Reports that she has been having worsening SOB and right arm swelling over the last week.  She had an outpatient CT chest at [facility] today and was sent to the ER by her oncologist for a large right pleural effusion.  Pt desaturating on arrival to ED requiring nasal O2.  CXR with right lung white out.  R pigtail placed with almost 4L total out.    -Continue pigtail to water seal  -Ct Chest non contrast to eval R chest/lung/pleura after effusion drained  -AM CXR  -Labs  -f/u cytology    D/W thoracic team

## 2022-06-29 NOTE — PROGRESS NOTE ADULT - SUBJECTIVE AND OBJECTIVE BOX
37y Female s/p R pigtail for large R pleural effusion    Subjective: Feels ok at the moment, no discomfort.  No more coughing.  R pigtail in place, draining serosanguinous fluid.    T(C): 36.9 (06-29-22 @ 11:31), Max: 36.9 (06-28-22 @ 23:43)  HR: 93 (06-29-22 @ 11:31) (90 - 105)  BP: 127/77 (06-29-22 @ 11:31) (127/77 - 151/89)  ABP: --  ABP(mean): --  RR: 20 (06-29-22 @ 11:31) (20 - 20)  SpO2: 94% (06-29-22 @ 11:31) (91% - 96%)  Wt(kg): --  CVP(mm Hg): --  CO: --  CI: --  PA: --         06-28    138  |  100  |  14.4  ----------------------------<  104<H>  3.9   |  28.0  |  0.80    Ca    9.0      28 Jun 2022 15:39    TPro  7.0  /  Alb  3.5  /  TBili  <0.2<L>  /  DBili  x   /  AST  27  /  ALT  36<H>  /  AlkPhos  74  06-28                               10.7   10.66 )-----------( 416      ( 29 Jun 2022 10:00 )             35.3        PT/INR - ( 28 Jun 2022 15:39 )   PT: 13.2 sec;   INR: 1.14 ratio         PTT - ( 28 Jun 2022 15:39 )  PTT:29.3 sec             CAPILLARY BLOOD GLUCOSE               CXR:    I&O's Detail    28 Jun 2022 07:01  -  29 Jun 2022 07:00  --------------------------------------------------------  IN:    Oral Fluid: 600 mL  Total IN: 600 mL    OUT:    Chest Tube (mL): 4140 mL  Total OUT: 4140 mL    Total NET: -3540 mL          MEDICATIONS  (STANDING):    MEDICATIONS  (PRN):  acetaminophen     Tablet .. 650 milliGRAM(s) Oral every 6 hours PRN Temp greater or equal to 38C (100.4F), Mild Pain (1 - 3)  benzonatate 100 milliGRAM(s) Oral every 8 hours PRN Cough  guaiFENesin Oral Liquid (Sugar-Free) 100 milliGRAM(s) Oral every 6 hours PRN Cough  ondansetron Injectable 4 milliGRAM(s) IV Push every 6 hours PRN Nausea and/or Vomiting  oxycodone    5 mG/acetaminophen 325 mG 1 Tablet(s) Oral every 6 hours PRN Moderate Pain (4 - 6)  traMADol 50 milliGRAM(s) Oral every 8 hours PRN Severe Pain (7 - 10)      Physical Exam  Neuro: A+O x 3, non-focal, speech clear and intact  Pulm:decreased on ADONIS Hi dressing C/D/I    -----------------------------------------------------------------------------------------------------------------------------------------------------------------------------  -----------------------------------------------------------------------------------------------------------------------------------------------------------------------------

## 2022-06-29 NOTE — PROGRESS NOTE ADULT - REASON FOR ADMISSION
Acute hypoxemic respiratory failure, large pleural effusion
Acute hypoxemic respiratory failure, large pleural effusion

## 2022-06-29 NOTE — PROGRESS NOTE ADULT - SUBJECTIVE AND OBJECTIVE BOX
CHIEF COMPLAINT/INTERVAL HISTORY:    Patient is a 37y old  Female who presents with a chief complaint of Acute hypoxemic respiratory failure, large pleural effusion (29 Jun 2022 11:04)    SUBJECTIVE & OBJECTIVE: Pt seen and examined at bedside. No overnight events. Patient reports feeling significantly better today. O2 requirements also improving. Denies fevers or chills. Eager to be discharged.    ROS: No chest pain, palpitations, light headedness, dizziness, headache, nausea/vomiting, fevers/chills, abdominal pain, dysuria or increased urinary frequency.    ICU Vital Signs Last 24 Hrs  T(C): 36.9 (29 Jun 2022 11:31), Max: 37.5 (28 Jun 2022 14:54)  T(F): 98.4 (29 Jun 2022 11:31), Max: 99.5 (28 Jun 2022 14:54)  HR: 93 (29 Jun 2022 11:31) (90 - 105)  BP: 127/77 (29 Jun 2022 11:31) (127/77 - 151/89)  RR: 20 (29 Jun 2022 11:31) (20 - 20)  SpO2: 94% (29 Jun 2022 11:31) (85% - 96%)    MEDICATIONS  (STANDING):    MEDICATIONS  (PRN):  acetaminophen     Tablet .. 650 milliGRAM(s) Oral every 6 hours PRN Temp greater or equal to 38C (100.4F), Mild Pain (1 - 3)  benzonatate 100 milliGRAM(s) Oral every 8 hours PRN Cough  guaiFENesin Oral Liquid (Sugar-Free) 100 milliGRAM(s) Oral every 6 hours PRN Cough  ondansetron Injectable 4 milliGRAM(s) IV Push every 6 hours PRN Nausea and/or Vomiting  oxycodone    5 mG/acetaminophen 325 mG 1 Tablet(s) Oral every 6 hours PRN Moderate Pain (4 - 6)  traMADol 50 milliGRAM(s) Oral every 8 hours PRN Severe Pain (7 - 10)      LABS:                        10.7   10.66 )-----------( 416      ( 29 Jun 2022 10:00 )             35.3     06-28    138  |  100  |  14.4  ----------------------------<  104<H>  3.9   |  28.0  |  0.80    Ca    9.0      28 Jun 2022 15:39    TPro  7.0  /  Alb  3.5  /  TBili  <0.2<L>  /  DBili  x   /  AST  27  /  ALT  36<H>  /  AlkPhos  74  06-28    PT/INR - ( 28 Jun 2022 15:39 )   PT: 13.2 sec;   INR: 1.14 ratio         PTT - ( 28 Jun 2022 15:39 )  PTT:29.3 sec    PHYSICAL EXAM:    GENERAL: young female, sitting in bed, NAD  HEAD:  Atraumatic, Normocephalic  EYES: EOMI, PERRLA, conjunctiva and sclera clear  ENMT: Moist mucous membranes  NECK: Supple   NERVOUS SYSTEM:  Alert & Oriented X3, Motor Strength 5/5 B/L upper and lower extremities; DTRs 2+ intact and symmetric  CHEST/LUNG: diminished right sided breath sounds  HEART: Regular rate and rhythm; +S1/S2  ABDOMEN: Soft, Nontender, Nondistended; Bowel sounds present  EXTREMITIES:  no pedal edema, RUE lymphedema

## 2022-06-29 NOTE — CONSULT NOTE ADULT - ASSESSMENT
37-year-old female was evaluated in Hematology Clinic yesterday for locally advanced fungating right breast mass with associated right upper axillary swelling and imaging suggestive of a contralateral metastasis of the left breast.    CT chest angiogram on 628 performed yesterday revealed below mentioned findings including large right pleural effusion with complete atelectatic collapse of right lun) Suboptimal evaluation for pulmonary embolism due to significant image degradation related to body habitus. Within this limitation, no pulmonary thromboembolism identified in main pulmonary artery, or in the right and left pulmonary arteries. However, lobar, segmental and subsegmental branches are not well evaluated and thromboembolism is not excluded.  2) Large right pleural effusion with complete atelectatic collapse of right lung.  3) Confluent soft tissue densities in right breast with prominent skin thickening and soft tissue edema. Extensive confluent right axillary and subpectoral lymphadenopathy. Overall, consistent with right breast malignancy.  4) Left axillary lymphadenopathy. Skin thickening and soft tissue edema within medial aspect of left breast.     S/p right chest tube placement and blood fluid aspirated    -please send cell cytology  - 37-year-old female with past medical history of intermittent asthma, recently evaluated in Hematology Oncology Clinic,  where she was seen yesterday, reported exertional dyspnea, for which CT chest angiogram was performed on 06/28 with below mentioned findings in detail including large right-sided pleural effusion with complete atelectatic collapse of the right lung,  But no evidence of PE.  Patient was initially seen in Oncology Clinic on 06/24, thereafter biopsy of right breast was performed on 06/27 revealing infiltrating ductal carcinoma with micro papillary features.  Yesterday in the clinic, patient reported exertional dyspnea for which CT chest was performed.  S/p right chest tube placement and bloody pleural fluid aspirated    -please send cell cytology  -given the likelihood of metastatic disease in pleura, patient will need completion of staging workup (recommend obtaining CT abdomen with contrast to complete staging work up); if aggressive pathology such as HER2 or triple negative or CNS symptoms then MRI brain should be performed on outpatient basis as staging w/up  -on outpatient basis patient would benefit from bone scan  -will follow-up ER/MD and HER2 status  -follow up cardiothoracic surgery recs    will follow

## 2022-06-29 NOTE — CONSULT NOTE ADULT - SUBJECTIVE AND OBJECTIVE BOX
37-year-old female was evaluated in Hematology Clinic yesterday for locally advanced fungating right breast mass with associated right upper axillary swelling and imaging suggestive of a contralateral metastasis of the left breast.    CT chest angiogram on 628 performed yesterday revealed below mentioned findings including large right pleural effusion with complete atelectatic collapse of right lun) Suboptimal evaluation for pulmonary embolism due to significant image degradation related to body habitus. Within this limitation, no pulmonary thromboembolism identified in main pulmonary artery, or in the right and left pulmonary arteries. However, lobar, segmental and subsegmental branches are not well evaluated and thromboembolism is not excluded.  2) Large right pleural effusion with complete atelectatic collapse of right lung.  3) Confluent soft tissue densities in right breast with prominent skin thickening and soft tissue edema. Extensive confluent right axillary and subpectoral lymphadenopathy. Overall, consistent with right breast malignancy.  4) Left axillary lymphadenopathy. Skin thickening and soft tissue edema within medial aspect of left breast.     ICU Vital Signs Last 24 Hrs  T(C): 36.9 (2022 07:32), Max: 37.5 (2022 14:54)  T(F): 98.4 (2022 07:32), Max: 99.5 (2022 14:54)  HR: 90 (2022 07:32) (90 - 105)  BP: 129/79 (2022 07:32) (129/79 - 151/89)  BP(mean): --  ABP: --  ABP(mean): --  RR: 20 (2022 07:32) (20 - 20)  SpO2: 91% (2022 07:32) (85% - 96%)    CBC:            10.7   10.66 )-----------( 416      ( 22 @ 10:00 )             35.3         Chem:         ( 22 @ 15:39 )    138  |  100  |  14.4  ----------------------------<  104<H>  3.9   |  28.0  |  0.80        Liver Functions: ( 22 @ 15:39 )  Alb: 3.5 g/dL / Pro: 7.0 g/dL / ALK PHOS: 74 U/L / ALT: 36 U/L / AST: 27 U/L / GGT: x         37-year-old femaleWith past medical history of intermittent asthma, recently evaluated in Hematology Oncology Clinic,  where she was seen yesterday, reported exertional dyspnea, for which CT chest angiogram was performed on  with below mentioned findings in detail including large right-sided pleural effusion with complete atelectatic collapse of the right lung,  But no evidence of PE.    Patient was initially seen in Oncology Clinic on , thereafter biopsy of right breast was performed on  revealing infiltrating ductal carcinoma with micro papillary features.  Yesterday in the clinic, patient reported exertional dyspnea for which CT chest was performed.    CT chest angiogram on 628 performed yesterday revealed below mentioned findings including large right pleural effusion with complete atelectatic collapse of right lun) Suboptimal evaluation for pulmonary embolism due to significant image degradation related to body habitus. Within this limitation, no pulmonary thromboembolism identified in main pulmonary artery, or in the right and left pulmonary arteries. However, lobar, segmental and subsegmental branches are not well evaluated and thromboembolism is not excluded.  2) Large right pleural effusion with complete atelectatic collapse of right lung.  3) Confluent soft tissue densities in right breast with prominent skin thickening and soft tissue edema. Extensive confluent right axillary and subpectoral lymphadenopathy. Overall, consistent with right breast malignancy.  4) Left axillary lymphadenopathy. Skin thickening and soft tissue edema within medial aspect of left breast.     Biopsy on  at [facility] (biopsy of right breast 12 o'clock position, ultrasound-guided biopsy):  Infiltrating ductal carcinoma with micro papillary features.  Poorly differentiated.  Munden score 9 of 9.  Three hundred four core biopsy fragments were involved in the diagnosis.  The largest linear measurement of invasion on a single core fragment was 13 mm.  Focally suspicious for lymphatic invasion.    ICU Vital Signs Last 24 Hrs  T(C): 36.9 (2022 07:32), Max: 37.5 (2022 14:54)  T(F): 98.4 (2022 07:32), Max: 99.5 (2022 14:54)  HR: 90 (2022 07:32) (90 - 105)  BP: 129/79 (2022 07:32) (129/79 - 151/89)  BP(mean): --  ABP: --  ABP(mean): --  RR: 20 (2022 07:32) (20 - 20)  SpO2: 91% (2022 07:32) (85% - 96%)    CBC:            10.7   10.66 )-----------( 416      ( 22 @ 10:00 )             35.3         Chem:         ( 22 @ 15:39 )    138  |  100  |  14.4  ----------------------------<  104<H>  3.9   |  28.0  |  0.80    Liver Functions: ( 22 @ 15:39 )  Alb: 3.5 g/dL / Pro: 7.0 g/dL / ALK PHOS: 74 U/L / ALT: 36 U/L / AST: 27 U/L / GGT: x          MEDICATIONS  (PRN):  acetaminophen     Tablet .. 650 milliGRAM(s) Oral every 6 hours PRN Temp greater or equal to 38C (100.4F), Mild Pain (1 - 3)  benzonatate 100 milliGRAM(s) Oral every 8 hours PRN Cough  guaiFENesin Oral Liquid (Sugar-Free) 100 milliGRAM(s) Oral every 6 hours PRN Cough  ondansetron Injectable 4 milliGRAM(s) IV Push every 6 hours PRN Nausea and/or Vomiting  oxycodone    5 mG/acetaminophen 325 mG 1 Tablet(s) Oral every 6 hours PRN Moderate Pain (4 - 6)  traMADol 50 milliGRAM(s) Oral every 8 hours PRN Severe Pain (7 - 10)

## 2022-06-30 ENCOUNTER — TRANSCRIPTION ENCOUNTER (OUTPATIENT)
Age: 37
End: 2022-06-30

## 2022-06-30 VITALS
HEART RATE: 99 BPM | SYSTOLIC BLOOD PRESSURE: 132 MMHG | DIASTOLIC BLOOD PRESSURE: 84 MMHG | OXYGEN SATURATION: 91 % | RESPIRATION RATE: 19 BRPM | TEMPERATURE: 98 F

## 2022-06-30 LAB
ANION GAP SERPL CALC-SCNC: 11 MMOL/L — SIGNIFICANT CHANGE UP (ref 5–17)
BASOPHILS # BLD AUTO: 0.06 K/UL — SIGNIFICANT CHANGE UP (ref 0–0.2)
BASOPHILS NFR BLD AUTO: 0.7 % — SIGNIFICANT CHANGE UP (ref 0–2)
BUN SERPL-MCNC: 10.5 MG/DL — SIGNIFICANT CHANGE UP (ref 8–20)
CALCIUM SERPL-MCNC: 8.3 MG/DL — LOW (ref 8.6–10.2)
CHLORIDE SERPL-SCNC: 99 MMOL/L — SIGNIFICANT CHANGE UP (ref 98–107)
CO2 SERPL-SCNC: 27 MMOL/L — SIGNIFICANT CHANGE UP (ref 22–29)
CREAT SERPL-MCNC: 0.67 MG/DL — SIGNIFICANT CHANGE UP (ref 0.5–1.3)
EGFR: 115 ML/MIN/1.73M2 — SIGNIFICANT CHANGE UP
EOSINOPHIL # BLD AUTO: 0.28 K/UL — SIGNIFICANT CHANGE UP (ref 0–0.5)
EOSINOPHIL NFR BLD AUTO: 3 % — SIGNIFICANT CHANGE UP (ref 0–6)
GLUCOSE SERPL-MCNC: 144 MG/DL — HIGH (ref 70–99)
HCT VFR BLD CALC: 31.9 % — LOW (ref 34.5–45)
HGB BLD-MCNC: 9.5 G/DL — LOW (ref 11.5–15.5)
IMM GRANULOCYTES NFR BLD AUTO: 1 % — SIGNIFICANT CHANGE UP (ref 0–1.5)
LYMPHOCYTES # BLD AUTO: 0.67 K/UL — LOW (ref 1–3.3)
LYMPHOCYTES # BLD AUTO: 7.3 % — LOW (ref 13–44)
MAGNESIUM SERPL-MCNC: 1.9 MG/DL — SIGNIFICANT CHANGE UP (ref 1.6–2.6)
MCHC RBC-ENTMCNC: 26 PG — LOW (ref 27–34)
MCHC RBC-ENTMCNC: 29.8 GM/DL — LOW (ref 32–36)
MCV RBC AUTO: 87.2 FL — SIGNIFICANT CHANGE UP (ref 80–100)
MONOCYTES # BLD AUTO: 0.6 K/UL — SIGNIFICANT CHANGE UP (ref 0–0.9)
MONOCYTES NFR BLD AUTO: 6.5 % — SIGNIFICANT CHANGE UP (ref 2–14)
NEUTROPHILS # BLD AUTO: 7.53 K/UL — HIGH (ref 1.8–7.4)
NEUTROPHILS NFR BLD AUTO: 81.5 % — HIGH (ref 43–77)
PHOSPHATE SERPL-MCNC: 4.1 MG/DL — SIGNIFICANT CHANGE UP (ref 2.4–4.7)
PLATELET # BLD AUTO: 411 K/UL — HIGH (ref 150–400)
POTASSIUM SERPL-MCNC: 3.7 MMOL/L — SIGNIFICANT CHANGE UP (ref 3.5–5.3)
POTASSIUM SERPL-SCNC: 3.7 MMOL/L — SIGNIFICANT CHANGE UP (ref 3.5–5.3)
PROCALCITONIN SERPL-MCNC: 0.11 NG/ML — HIGH (ref 0.02–0.1)
RBC # BLD: 3.66 M/UL — LOW (ref 3.8–5.2)
RBC # FLD: 14.2 % — SIGNIFICANT CHANGE UP (ref 10.3–14.5)
SODIUM SERPL-SCNC: 137 MMOL/L — SIGNIFICANT CHANGE UP (ref 135–145)
WBC # BLD: 9.23 K/UL — SIGNIFICANT CHANGE UP (ref 3.8–10.5)
WBC # FLD AUTO: 9.23 K/UL — SIGNIFICANT CHANGE UP (ref 3.8–10.5)

## 2022-06-30 PROCEDURE — 99239 HOSP IP/OBS DSCHRG MGMT >30: CPT

## 2022-06-30 PROCEDURE — 71045 X-RAY EXAM CHEST 1 VIEW: CPT | Mod: 26,76

## 2022-06-30 RX ORDER — SODIUM CHLORIDE 0.65 %
1 AEROSOL, SPRAY (ML) NASAL
Refills: 0 | Status: DISCONTINUED | OUTPATIENT
Start: 2022-06-30 | End: 2022-06-30

## 2022-06-30 RX ADMIN — OXYCODONE AND ACETAMINOPHEN 1 TABLET(S): 5; 325 TABLET ORAL at 10:28

## 2022-06-30 RX ADMIN — TRAMADOL HYDROCHLORIDE 50 MILLIGRAM(S): 50 TABLET ORAL at 00:14

## 2022-06-30 NOTE — DISCHARGE NOTE PROVIDER - PROVIDER TOKENS
PROVIDER:[TOKEN:[[id number]]],FREE:[LAST:[Primary Care Provider],PHONE:[(   )    -],FAX:[(   )    -],FOLLOWUP:[1 week]]

## 2022-06-30 NOTE — DISCHARGE NOTE NURSING/CASE MANAGEMENT/SOCIAL WORK - NSDCPEFALRISK_GEN_ALL_CORE
For information on Fall & Injury Prevention, visit: [email or web address] OR  [email or web address] OR  https://www.cdc.gov/steadi/patient.html

## 2022-06-30 NOTE — DISCHARGE NOTE PROVIDER - ATTENDING DISCHARGE PHYSICAL EXAMINATION:
PHYSICAL EXAM:    GENERAL: young female, sitting in bed, NAD  HEAD:  Atraumatic, Normocephalic  EYES: EOMI, PERRLA, conjunctiva and sclera clear  ENMT: Moist mucous membranes  NECK: Supple   NERVOUS SYSTEM:  Alert & Oriented X3, Motor Strength 5/5 B/L upper and lower extremities   CHEST/LUNG: diminished right sided breath sounds  HEART: Regular rate and rhythm; +S1/S2  ABDOMEN: Soft, Nontender, Nondistended; Bowel sounds present  EXTREMITIES:  no pedal edema, RUE lymphedema

## 2022-06-30 NOTE — DISCHARGE NOTE PROVIDER - NSDCCPCAREPLAN_GEN_ALL_CORE_FT
PRINCIPAL DISCHARGE DIAGNOSIS  Diagnosis: Pleural effusion  Assessment and Plan of Treatment: Likely from malignancy, chest tube has now been removed. You need to follow up closely with oncology for treatment of cancer.      SECONDARY DISCHARGE DIAGNOSES  Diagnosis: Breast cancer  Assessment and Plan of Treatment: Please follow up with oncology for results of testing. You may need an MRI of your brain.     PRINCIPAL DISCHARGE DIAGNOSIS  Diagnosis: Pleural effusion  Assessment and Plan of Treatment: Likely from malignancy, chest tube has now been removed. You need to follow up closely with oncology for treatment of cancer and results of your cytology results.      SECONDARY DISCHARGE DIAGNOSES  Diagnosis: Breast cancer  Assessment and Plan of Treatment: Please follow up with oncology for results of testing and further management. You may need an MRI of your brain.    Diagnosis: DEVONTE (obstructive sleep apnea)  Assessment and Plan of Treatment: you most likely have sleep apnea and should be evaluated by a formal sleep study as outpatient  please follow up with your PCP within 1 week    Diagnosis: Lymphedema  Assessment and Plan of Treatment: follow up with oncology as scheduled

## 2022-06-30 NOTE — DISCHARGE NOTE PROVIDER - HOSPITAL COURSE
37 year old female with PMH of mild intermittent asthma, recently diagnosed R-breast CA on 6/24/22, pending staging and treatment plan, presenting 1week hx of exertional dyspnea who was sent to hospital after outpatient imaging revealed a new pleural effusion . ADmitted for AHRF 2/2 likely pleural effusion. CTS saw the patient in consult, chest tube placed, fluid drained. Also noted to have RUE swelling, likely due lymphangitic spread from breast CA. Pigtail removed, respiratory status improved. The patient is now medically stable for discharge with close outpatient oncologic follow up.      37 year old female with PMH of mild intermittent asthma, recently diagnosed R-breast CA on 6/24/22, pending staging and treatment plan, presenting 1week hx of exertional dyspnea who was sent to hospital after outpatient imaging revealed a new pleural effusion . ADmitted for AHRF 2/2 likely pleural effusion. CTS saw the patient in consult, chest tube placed, fluid drained. Also noted to have RUE swelling, likely due lymphangitic spread from breast CA. Pigtail removed, respiratory status improved. Weaned off O2 and remains stable on room air at rest and on ambulation. Peroids of desaturations while sleeping; needs outpatient sleep study. The patient is now medically stable for discharge with close outpatient oncologic follow up.

## 2022-06-30 NOTE — DISCHARGE NOTE PROVIDER - CARE PROVIDER_API CALL
Prashanth Flores)  Internal Medicine  [address] [place] [address]  Phone: [phone]  Fax: [phone]  Follow Up Time:     Primary Care Provider,   Phone: (   )    -  Fax: (   )    -  Follow Up Time: 1 week

## 2022-06-30 NOTE — DISCHARGE NOTE NURSING/CASE MANAGEMENT/SOCIAL WORK - PATIENT PORTAL LINK FT
You can access the FollowMyHealth Patient Portal offered by [facility] by registering at the following website: [email or web address]. By joining [facility]’s FollowMyHealth portal, you will also be able to view your health information using other applications (apps) compatible with our system.

## 2022-06-30 NOTE — CHART NOTE - NSCHARTNOTEFT_GEN_A_CORE
CT Chest and AM CXR reviewed  PTC with minimal drainage  PTC removed at bedside without complication  Repeat CXR stable  Thoracic Surgery to sign off, stable for discharge from Thoracic Surgery standpoint  Please reconsult as necessary

## 2022-07-03 LAB
CULTURE RESULTS: NO GROWTH — SIGNIFICANT CHANGE UP
CULTURE RESULTS: SIGNIFICANT CHANGE UP
CULTURE RESULTS: SIGNIFICANT CHANGE UP
SPECIMEN SOURCE: SIGNIFICANT CHANGE UP

## 2022-07-08 ENCOUNTER — INPATIENT (INPATIENT)
Facility: HOSPITAL | Age: 37
LOS: 3 days | Discharge: HOME | DRG: 597 | End: 2022-07-12
Attending: INTERNAL MEDICINE | Admitting: HOSPITALIST
Payer: MEDICAID

## 2022-07-08 VITALS
SYSTOLIC BLOOD PRESSURE: 156 MMHG | RESPIRATION RATE: 16 BRPM | HEIGHT: 64 IN | TEMPERATURE: 98 F | OXYGEN SATURATION: 87 % | WEIGHT: 293 LBS | HEART RATE: 117 BPM | DIASTOLIC BLOOD PRESSURE: 88 MMHG

## 2022-07-08 DIAGNOSIS — Z98.890 OTHER SPECIFIED POSTPROCEDURAL STATES: Chronic | ICD-10-CM

## 2022-07-08 LAB
ALBUMIN SERPL ELPH-MCNC: 3.2 G/DL — LOW (ref 3.3–5.2)
ALP SERPL-CCNC: 64 U/L — SIGNIFICANT CHANGE UP (ref 40–120)
ALT FLD-CCNC: 19 U/L — SIGNIFICANT CHANGE UP
ANION GAP SERPL CALC-SCNC: 9 MMOL/L — SIGNIFICANT CHANGE UP (ref 5–17)
ANISOCYTOSIS BLD QL: SLIGHT — SIGNIFICANT CHANGE UP
APTT BLD: 31.6 SEC — SIGNIFICANT CHANGE UP (ref 27.5–35.5)
AST SERPL-CCNC: 23 U/L — SIGNIFICANT CHANGE UP
BASOPHILS # BLD AUTO: 0.12 K/UL — SIGNIFICANT CHANGE UP (ref 0–0.2)
BASOPHILS NFR BLD AUTO: 0.9 % — SIGNIFICANT CHANGE UP (ref 0–2)
BILIRUB SERPL-MCNC: 0.2 MG/DL — LOW (ref 0.4–2)
BUN SERPL-MCNC: 12.1 MG/DL — SIGNIFICANT CHANGE UP (ref 8–20)
CALCIUM SERPL-MCNC: 8.4 MG/DL — LOW (ref 8.6–10.2)
CHLORIDE SERPL-SCNC: 101 MMOL/L — SIGNIFICANT CHANGE UP (ref 98–107)
CO2 SERPL-SCNC: 26 MMOL/L — SIGNIFICANT CHANGE UP (ref 22–29)
CREAT SERPL-MCNC: 0.81 MG/DL — SIGNIFICANT CHANGE UP (ref 0.5–1.3)
EGFR: 96 ML/MIN/1.73M2 — SIGNIFICANT CHANGE UP
EOSINOPHIL # BLD AUTO: 0 K/UL — SIGNIFICANT CHANGE UP (ref 0–0.5)
EOSINOPHIL NFR BLD AUTO: 0 % — SIGNIFICANT CHANGE UP (ref 0–6)
GIANT PLATELETS BLD QL SMEAR: PRESENT — SIGNIFICANT CHANGE UP
GLUCOSE SERPL-MCNC: 179 MG/DL — HIGH (ref 70–99)
HCT VFR BLD CALC: 32.8 % — LOW (ref 34.5–45)
HGB BLD-MCNC: 9.8 G/DL — LOW (ref 11.5–15.5)
INR BLD: 1.17 RATIO — HIGH (ref 0.88–1.16)
LYMPHOCYTES # BLD AUTO: 0.12 K/UL — LOW (ref 1–3.3)
LYMPHOCYTES # BLD AUTO: 0.9 % — LOW (ref 13–44)
MANUAL SMEAR VERIFICATION: SIGNIFICANT CHANGE UP
MCHC RBC-ENTMCNC: 25.6 PG — LOW (ref 27–34)
MCHC RBC-ENTMCNC: 29.9 GM/DL — LOW (ref 32–36)
MCV RBC AUTO: 85.6 FL — SIGNIFICANT CHANGE UP (ref 80–100)
MICROCYTES BLD QL: SLIGHT — SIGNIFICANT CHANGE UP
MONOCYTES # BLD AUTO: 0.22 K/UL — SIGNIFICANT CHANGE UP (ref 0–0.9)
MONOCYTES NFR BLD AUTO: 1.7 % — LOW (ref 2–14)
NEUTROPHILS # BLD AUTO: 12.67 K/UL — HIGH (ref 1.8–7.4)
NEUTROPHILS NFR BLD AUTO: 96.5 % — HIGH (ref 43–77)
NT-PROBNP SERPL-SCNC: 75 PG/ML — SIGNIFICANT CHANGE UP (ref 0–300)
PLAT MORPH BLD: NORMAL — SIGNIFICANT CHANGE UP
PLATELET # BLD AUTO: 414 K/UL — HIGH (ref 150–400)
POLYCHROMASIA BLD QL SMEAR: SIGNIFICANT CHANGE UP
POTASSIUM SERPL-MCNC: 3.9 MMOL/L — SIGNIFICANT CHANGE UP (ref 3.5–5.3)
POTASSIUM SERPL-SCNC: 3.9 MMOL/L — SIGNIFICANT CHANGE UP (ref 3.5–5.3)
PROT SERPL-MCNC: 6.3 G/DL — LOW (ref 6.6–8.7)
PROTHROM AB SERPL-ACNC: 13.6 SEC — HIGH (ref 10.5–13.4)
RBC # BLD: 3.83 M/UL — SIGNIFICANT CHANGE UP (ref 3.8–5.2)
RBC # FLD: 15 % — HIGH (ref 10.3–14.5)
RBC BLD AUTO: SIGNIFICANT CHANGE UP
SODIUM SERPL-SCNC: 136 MMOL/L — SIGNIFICANT CHANGE UP (ref 135–145)
TROPONIN T SERPL-MCNC: 0.01 NG/ML — SIGNIFICANT CHANGE UP (ref 0–0.06)
WBC # BLD: 13.13 K/UL — HIGH (ref 3.8–10.5)
WBC # FLD AUTO: 13.13 K/UL — HIGH (ref 3.8–10.5)

## 2022-07-08 PROCEDURE — 99285 EMERGENCY DEPT VISIT HI MDM: CPT

## 2022-07-08 PROCEDURE — 71275 CT ANGIOGRAPHY CHEST: CPT | Mod: 26,MA

## 2022-07-08 RX ORDER — PIPERACILLIN AND TAZOBACTAM 4; .5 G/20ML; G/20ML
3.38 INJECTION, POWDER, LYOPHILIZED, FOR SOLUTION INTRAVENOUS ONCE
Refills: 0 | Status: COMPLETED | OUTPATIENT
Start: 2022-07-08 | End: 2022-07-08

## 2022-07-08 RX ORDER — SODIUM CHLORIDE 9 MG/ML
1000 INJECTION INTRAMUSCULAR; INTRAVENOUS; SUBCUTANEOUS ONCE
Refills: 0 | Status: COMPLETED | OUTPATIENT
Start: 2022-07-08 | End: 2022-07-08

## 2022-07-08 RX ORDER — VANCOMYCIN HCL 1 G
1000 VIAL (EA) INTRAVENOUS ONCE
Refills: 0 | Status: COMPLETED | OUTPATIENT
Start: 2022-07-08 | End: 2022-07-08

## 2022-07-08 NOTE — ED ADULT NURSE NOTE - CHPI ED NUR TIMING2
Patient admitted earlier this morning, case d.justus Ngo. Patient currently in [facility] [address] and chart reviewed. Cont current care and f/u post OP course with ortho. sudden onset

## 2022-07-08 NOTE — ED PROVIDER NOTE - CLINICAL SUMMARY MEDICAL DECISION MAKING FREE TEXT BOX
Patient presents with new onset acute hypoxemia in the setting of breast CA.  Lab values c/w infection vs reaction to treatment.  otherwise, Lab values do not require emergent intervention. CTA with pleural effusion.  CT surg will consult.  d/w Dr. Dyson and will admit.  VSS.  Non toxic.  Well appearing. Uneventful ED observation period.

## 2022-07-08 NOTE — ED PROVIDER NOTE - PHYSICAL EXAMINATION
Gen: Alert, NAD  Head: NC, AT, PERRL, EOMI, normal lids/conjunctiva  ENT: normal hearing, patent oropharynx without erythema/exudate, uvula midline  Neck: +supple, no tenderness/meningismus/JVD, +Trachea midline  Pulm: Bilateral BS, normal resp effort, no wheeze/stridor/retractions  CV: RRR, no R/G, +dist pulses  Abd: soft, NT/ND, +BS, no hepatosplenomegaly  Mskel: RUE edema, no erythema/cyanosis  Skin: no rash  Neuro: AAOx3, no gross sensory/motor deficits,

## 2022-07-08 NOTE — ED PROVIDER NOTE - OBJECTIVE STATEMENT
Pertinent PMH/PSH/FHx/SHx and Review of Systems contained within:  Patient presents to the ED for new onset hypoxemia with dyspnea that started shortly after her first chemo treatment today for malignant breast CA.  PMH of breast CA, PCOS, and RUE lymphedema, and right sided pleural effusion.  Otherwise baseline.  was baseline prior to infusion.  Non toxic.  Well appearing. No aggravating or relieving factors. No other pertinent PMH.  No other pertinent PSH.  No other pertinent FHx.  Patient denies EtOH/tobacco/illicit substance use. No fever/chills, No photophobia/eye pain/changes in vision, No ear pain/sore throat/dysphagia, No chest pain/palpitations, no cough/wheeze/stridor, No abdominal pain, No N/V/D, no dysuria/frequency/discharge, No neck/back pain, no rash, no changes in neurological status/function.

## 2022-07-08 NOTE — ED ADULT NURSE NOTE - OBJECTIVE STATEMENT
Pt present to ED from Oncology office for sudden onset of SOB and hypoxia, Pt was in office scheduled to get first chemo TX for breast CA - pt received Perjeta then Herceptin then began feeling flushed and anxious-given IV ativan. Pt states nurses said she was hypoxic- on arrival to ED pt O2 sat 88-90 on 2liters.  Pt with pleural effusions recently.    Pt in no resp distress- tachypneic

## 2022-07-08 NOTE — ED ADULT TRIAGE NOTE - CHIEF COMPLAINT QUOTE
Pt was at Hem/Onc office for first dose of chemo infusion and during infusion got very short of breath. As per staff her oxygen level dropped a little, staff at office unsure what it was. Pt is awake and alert, still feels a little short of breath. She states that she was here recently for pulmonary effusion. +Dyspnea on exertion as she walked to the bathroom upon arrival here.

## 2022-07-08 NOTE — ED PROVIDER NOTE - PROGRESS NOTE DETAILS
Given the significant and immediate threats to this patient based on initial presentation, the benefits of emergency contrast-enhanced CT imaging without obtaining GFR/creatinine or HCG serum level results greatly outweigh the potential risk of harm due to contrast-induced nephropathy or risk to pregnancy

## 2022-07-09 DIAGNOSIS — J90 PLEURAL EFFUSION, NOT ELSEWHERE CLASSIFIED: ICD-10-CM

## 2022-07-09 DIAGNOSIS — C50.919 MALIGNANT NEOPLASM OF UNSPECIFIED SITE OF UNSPECIFIED FEMALE BREAST: ICD-10-CM

## 2022-07-09 LAB
ALBUMIN SERPL ELPH-MCNC: 3 G/DL — LOW (ref 3.3–5.2)
ALP SERPL-CCNC: 61 U/L — SIGNIFICANT CHANGE UP (ref 40–120)
ALT FLD-CCNC: 17 U/L — SIGNIFICANT CHANGE UP
ANION GAP SERPL CALC-SCNC: 12 MMOL/L — SIGNIFICANT CHANGE UP (ref 5–17)
AST SERPL-CCNC: 20 U/L — SIGNIFICANT CHANGE UP
BILIRUB SERPL-MCNC: <0.2 MG/DL — LOW (ref 0.4–2)
BUN SERPL-MCNC: 10.9 MG/DL — SIGNIFICANT CHANGE UP (ref 8–20)
CALCIUM SERPL-MCNC: 8.6 MG/DL — SIGNIFICANT CHANGE UP (ref 8.6–10.2)
CHLORIDE SERPL-SCNC: 100 MMOL/L — SIGNIFICANT CHANGE UP (ref 98–107)
CO2 SERPL-SCNC: 25 MMOL/L — SIGNIFICANT CHANGE UP (ref 22–29)
CREAT SERPL-MCNC: 0.78 MG/DL — SIGNIFICANT CHANGE UP (ref 0.5–1.3)
EGFR: 100 ML/MIN/1.73M2 — SIGNIFICANT CHANGE UP
GLUCOSE SERPL-MCNC: 168 MG/DL — HIGH (ref 70–99)
HCT VFR BLD CALC: 28.6 % — LOW (ref 34.5–45)
HGB BLD-MCNC: 8.7 G/DL — LOW (ref 11.5–15.5)
MCHC RBC-ENTMCNC: 26 PG — LOW (ref 27–34)
MCHC RBC-ENTMCNC: 30.4 GM/DL — LOW (ref 32–36)
MCV RBC AUTO: 85.6 FL — SIGNIFICANT CHANGE UP (ref 80–100)
PLATELET # BLD AUTO: 413 K/UL — HIGH (ref 150–400)
POTASSIUM SERPL-MCNC: 4.3 MMOL/L — SIGNIFICANT CHANGE UP (ref 3.5–5.3)
POTASSIUM SERPL-SCNC: 4.3 MMOL/L — SIGNIFICANT CHANGE UP (ref 3.5–5.3)
PROT SERPL-MCNC: 6.3 G/DL — LOW (ref 6.6–8.7)
RBC # BLD: 3.34 M/UL — LOW (ref 3.8–5.2)
RBC # FLD: 14.7 % — HIGH (ref 10.3–14.5)
SARS-COV-2 RNA SPEC QL NAA+PROBE: SIGNIFICANT CHANGE UP
SODIUM SERPL-SCNC: 136 MMOL/L — SIGNIFICANT CHANGE UP (ref 135–145)
WBC # BLD: 12.25 K/UL — HIGH (ref 3.8–10.5)
WBC # FLD AUTO: 12.25 K/UL — HIGH (ref 3.8–10.5)

## 2022-07-09 PROCEDURE — 99223 1ST HOSP IP/OBS HIGH 75: CPT

## 2022-07-09 PROCEDURE — 12345: CPT | Mod: NC

## 2022-07-09 RX ORDER — ACETAMINOPHEN 500 MG
650 TABLET ORAL EVERY 6 HOURS
Refills: 0 | Status: DISCONTINUED | OUTPATIENT
Start: 2022-07-09 | End: 2022-07-12

## 2022-07-09 RX ORDER — CLONAZEPAM 1 MG
0.5 TABLET ORAL AT BEDTIME
Refills: 0 | Status: DISCONTINUED | OUTPATIENT
Start: 2022-07-09 | End: 2022-07-12

## 2022-07-09 RX ORDER — PIPERACILLIN AND TAZOBACTAM 4; .5 G/20ML; G/20ML
3.38 INJECTION, POWDER, LYOPHILIZED, FOR SOLUTION INTRAVENOUS EVERY 8 HOURS
Refills: 0 | Status: DISCONTINUED | OUTPATIENT
Start: 2022-07-09 | End: 2022-07-10

## 2022-07-09 RX ORDER — LANOLIN ALCOHOL/MO/W.PET/CERES
3 CREAM (GRAM) TOPICAL AT BEDTIME
Refills: 0 | Status: DISCONTINUED | OUTPATIENT
Start: 2022-07-09 | End: 2022-07-12

## 2022-07-09 RX ORDER — ONDANSETRON 8 MG/1
4 TABLET, FILM COATED ORAL EVERY 8 HOURS
Refills: 0 | Status: DISCONTINUED | OUTPATIENT
Start: 2022-07-09 | End: 2022-07-12

## 2022-07-09 RX ORDER — ENOXAPARIN SODIUM 100 MG/ML
40 INJECTION SUBCUTANEOUS EVERY 24 HOURS
Refills: 0 | Status: DISCONTINUED | OUTPATIENT
Start: 2022-07-09 | End: 2022-07-12

## 2022-07-09 RX ADMIN — Medication 250 MILLIGRAM(S): at 02:41

## 2022-07-09 RX ADMIN — ENOXAPARIN SODIUM 40 MILLIGRAM(S): 100 INJECTION SUBCUTANEOUS at 14:20

## 2022-07-09 RX ADMIN — PIPERACILLIN AND TAZOBACTAM 25 GRAM(S): 4; .5 INJECTION, POWDER, LYOPHILIZED, FOR SOLUTION INTRAVENOUS at 06:28

## 2022-07-09 RX ADMIN — Medication 0.5 MILLIGRAM(S): at 18:13

## 2022-07-09 RX ADMIN — PIPERACILLIN AND TAZOBACTAM 200 GRAM(S): 4; .5 INJECTION, POWDER, LYOPHILIZED, FOR SOLUTION INTRAVENOUS at 00:05

## 2022-07-09 RX ADMIN — PIPERACILLIN AND TAZOBACTAM 25 GRAM(S): 4; .5 INJECTION, POWDER, LYOPHILIZED, FOR SOLUTION INTRAVENOUS at 22:05

## 2022-07-09 RX ADMIN — PIPERACILLIN AND TAZOBACTAM 25 GRAM(S): 4; .5 INJECTION, POWDER, LYOPHILIZED, FOR SOLUTION INTRAVENOUS at 14:21

## 2022-07-09 NOTE — CONSULT NOTE ADULT - ASSESSMENT
ASSESSMENT:   37 year old female with PMH of Right Breast Cancer recently diagnosed, with plan to start chemotherapy this week, sent to the ED from chemo center for shortness of breath. Patient said she went for her first chemo treatment 7/8, she got immunotherapy, started feeling "hot", became short of breath, was placed on oxygen without improvement in O2 saturation, and was sent to the ED. Recently admitted 6/28 - 6/30/22 for right pleural effusion s/p pigtail placement with >4L drained.     PLAN:  Admitted to Medicine.  Continuous SpO2 monitoring.  Patient currently stable with SpO2 >92% on 2L NC while lying down/talking.  Maintain SpO2 >92%. Titrate O2 requirements PRN.   POCUS to be performed at bedside later today to further evaluate the Right sided pleural effusion / evaluation for a chest tube.   Will consider placing a chest tube sooner if patient experiences increased work of breathing, SpO2 starts to trend down, O2 requirements start to trend up.   Further plan as per primary team.   Will continue to follow along.  Case and Plan to be discussed / reviewed with Thoracic Surgery attending Dr. Gill / team during AM rounds.
Imp:  Locally advanced, likely metastatic breast cancer; admitted after reported rxn to Herceptin;  hypoxic in clinic seems to have sig lung involvement.  Seen by Thoracic surgery-plan at present for observation  Breathing comfortably  Plans for continuation of chemotherapy thru Dr. Cartagena as outpt  Anemia-Hb 8; would keep Hb above 7

## 2022-07-09 NOTE — H&P ADULT - NSHPPHYSICALEXAM_GEN_ALL_CORE
Vital Signs Last 24 Hrs  T(C): 36.7 (08 Jul 2022 23:35), Max: 36.9 (08 Jul 2022 20:03)  T(F): 98.1 (08 Jul 2022 23:35), Max: 98.4 (08 Jul 2022 20:03)  HR: 104 (08 Jul 2022 23:35) (104 - 117)  BP: 151/82 (08 Jul 2022 23:35) (150/95 - 156/88)  BP(mean): 114 (08 Jul 2022 20:03) (114 - 114)  RR: 17 (08 Jul 2022 23:35) (16 - 17)  SpO2: 98% (08 Jul 2022 23:35) (87% - 98%)    Parameters below as of 08 Jul 2022 23:35  Patient On (Oxygen Delivery Method): nasal cannula  O2 Flow (L/min): 3

## 2022-07-09 NOTE — PATIENT PROFILE ADULT - FALL HARM RISK - HARM RISK INTERVENTIONS

## 2022-07-09 NOTE — H&P ADULT - ASSESSMENT
38 y/o female with PMH of right breast ca was sent to the ED from chemo center for shortness of breath. Patient said she went for her first chemo treatment today, she got immunotherapy, after started feeling "hot", became short of breath, was placed on oxygen but her saturation did not improve. Did not get chemotherapy, was sent to the ED for evaluation.   In the ED, patient hypoxic to 87 on RA; CT chest with angio: no PE; Large nodular and loculated right pleural effusion with partial collapse of the right upper and lower lobes and complete collapse of the right middle lobe. The aerated right lung demonstrates nearly resolved opacity in the prior study. Linear right lower lobe opacity.       Acute respiratory failure with hypoxia due to R pleural effusion   Admit to medical floor with    CT chest as noted above   Stable on 2L of oxygen vis NC   CT surgery consulted     Leukocytosis likely due to PNA   WBC: 13.13 with left shift   Zosyn and Vancomycin given in the ED, will continue Zosyn   Blood culture x 2 sent, follow up  Trend WBC     R Breast ca  Follows with oncologist   S/p immunotherapy 07/08/22    Anxiety   Klonopin 0.5mg HS PRN     Supportive   DVT prophylaxis: Lovenox   Diet: DASH     Plan of care discussed with patient

## 2022-07-09 NOTE — CONSULT NOTE ADULT - SUBJECTIVE AND OBJECTIVE BOX
Thoracic Surgery Consult called for Right loculated Pleural Effusion    HPI:  38 y/o female with PMH of right breast ca was sent to the ED from chemo center for shortness of breath. Patient said she went for her first chemo treatment today, she got immunotherapy, after started feeling "hot", became short of breath, was placed on oxygen but her saturation did not improve. Did not get chemotherapy, was sent to the ED for evaluation. She has no chest pain, cough, palpitation, nausea, vomiting, abdominal pain, change in bowel/urinary habit.  (09 Jul 2022 03:10)    PAST MEDICAL & SURGICAL HISTORY:  Breast cancer  Mild intermittent asthma  Status post cervical polyp removal    REVIEW OF SYSTEMS:  General: +Mild fatigue, No Weight change/ HA/ Dizziness elicited.  Skin: No Rashes/ Lesions elicited.  Ophthalmologic: No change in vision/ Blindness elicited.  ENMT: No change in Hearing/ Drainage/ Lesions	elicited.  Respiratory and Thorax: +SOB, +CANTU. No Cough/ Wheezing/ Hemoptysis/ Sputum production elicited.  Cardiovascular: No Chest pain/ Palpitations/ Diaphoresis elicited.  Gastrointestinal: No Nausea/ Vomiting/ diarrhea elicited.  Genitourinary: No Heamturia/ Dysuria elicited.	  Musculoskeletal: No Pain/ joint swelling 	elicited.  Neurological: No Seizures/ TIA/ CVA/ Parastesias	elicited.  Psychiatric: No Dementia/ Depression/ SI/ HI elicited.  Hematology: No hx of bleeding elicited.  Endocrine: No Hyperglycemia/ Hypoglycemia elicited.  Allergic/Immunologic: +Acute breast cancer, supposed to start chemo. No Anaphylaxis/ Intolerance elicited.    MEDICATIONS  (STANDING):  enoxaparin Injectable 40 milliGRAM(s) SubCutaneous every 24 hours  piperacillin/tazobactam IVPB.. 3.375 Gram(s) IV Intermittent every 8 hours    MEDICATIONS  (PRN):  acetaminophen     Tablet .. 650 milliGRAM(s) Oral every 6 hours PRN Temp greater or equal to 38C (100.4F), Mild Pain (1 - 3)  aluminum hydroxide/magnesium hydroxide/simethicone Suspension 30 milliLiter(s) Oral every 4 hours PRN Dyspepsia  clonazePAM  Tablet 0.5 milliGRAM(s) Oral at bedtime PRN anxiety  melatonin 3 milliGRAM(s) Oral at bedtime PRN Insomnia  ondansetron Injectable 4 milliGRAM(s) IV Push every 8 hours PRN Nausea and/or Vomiting    Allergies: No Known Allergies    SOCIAL HISTORY:  Former cigarette stopped few weeks ago, no alcohol or illicit drug use. , lives with family    FAMILY HISTORY:  FH: breast cancer, cousin    Vital Signs Last 24 Hrs  T(C): 36.7 (09 Jul 2022 05:26), Max: 36.9 (08 Jul 2022 20:03)  T(F): 98.1 (09 Jul 2022 05:26), Max: 98.4 (08 Jul 2022 20:03)  HR: 100 (09 Jul 2022 05:26) (100 - 117)  BP: 123/76 (09 Jul 2022 05:26) (123/76 - 156/88)  BP(mean): 114 (08 Jul 2022 20:03) (114 - 114)  RR: 17 (09 Jul 2022 05:26) (16 - 17)  SpO2: 92% (09 Jul 2022 05:26) (87% - 98%)  Parameters below as of 09 Jul 2022 05:26  Patient On (Oxygen Delivery Method): nasal cannula  O2 Flow (L/min): 2    PHYSICAL EXAMINATION:  General: Lying on stretcher in ED with head elevated in NAD  Neurology: Awake, nonfocal, PALMA x 4  Eyes: Scleras clear, Gross vision intact  ENT: Gross hearing intact, grossly patent pharynx, no stridor  Neck: Neck supple, trachea midline, No JVD,   Respiratory: +Diminished BSs throughout right with scattered crackles, no accessory muscle use noted  CV: RRR, S1S2, no murmurs noted  Abdominal: obese, Soft, NT, ND +BS,   Extremities: trace edema, + peripheral pulses  Skin: No Rashes, Hematoma, Ecchymosis  Psych: Oriented x 3, normal affect    LABS:                        8.7    12.25 )-----------( 413      ( 09 Jul 2022 05:15 )             28.6     07-09    136  |  100  |  10.9  ----------------------------<  168<H>  4.3   |  25.0  |  0.78    Ca    8.6      09 Jul 2022 05:15    TPro  6.3<L>  /  Alb  3.0<L>  /  TBili  <0.2<L>  /  DBili  x   /  AST  20  /  ALT  17  /  AlkPhos  61  07-09    PT/INR - ( 08 Jul 2022 18:19 )   PT: 13.6 sec;   INR: 1.17 ratio      PTT - ( 08 Jul 2022 18:19 )  PTT:31.6 sec      RADIOLOGY & ADDITIONAL STUDIES:    CTA Chest:  < from: CT Angio Chest PE Protocol w/ IV Cont (07.08.22 @ 17:56) >  FINDINGS:  PULMONARY EMBOLISM: Limited evaluation for lobar, segmental and subsegmental pulmonary embolism. No main, left or right pulmonary embolism.  AIRWAYS AND LUNGS: The central tracheobronchial tree is patent.  Large nodular and loculated right pleural effusion with partial collapse of the right upper and lower lobes and complete collapse of the right middle lobe. The aerated right lung demonstrates nearly resolved opacity in the prior study. Linear right lower lobe opacity  MEDIASTINUM AND PLEURA: There are no enlarged mediastinal, hilar or axillary lymph nodes. The visualized portion of the thyroid gland is unremarkable. There is no pneumothorax.  HEART AND VESSELS: The heart is normal in size.   There are no atherosclerotic calcifications of the aorta.  There is a small pericardial effusion.  UPPER ABDOMEN: Images of the upper abdomen demonstrate no abnormality.  BONES AND SOFT TISSUES: The bones are unremarkable.  Right breast nodules extensive right breast and axillary nodes  IMPRESSION:  Limited evaluation for lobar, segmental and subsegmental pulmonary embolism. No main, left or right pulmonary embolism.  < end of copied text >  
HPI: Patient is a 37y Female seen on consultation for the evaluation and management of locally advanced, likely metastatic breast cancer, Her-2 pos.  Pt reports PMHx PCOS.  Recently diagnosed with locally advanced breast cancer-will obtain details.  Just had port placed and began chemiotherapy in office yesterday with Taxol/Perjeta/ Herceptin.  Reports reaction to Herceptin with desaturation  sent to ER.  Has large nodular  loculated pleural effusion with partial collapse of RUL/lower lobes, collapse RML.  Seen by thoracic surgery-awaiting input.   Pt is currently breathing comfortable with oxygen via N/C.  Denies SOB, chest pain, headaches or dizziness, fevers  chills or sweats, nausea or vomiting.  No PE      PAST MEDICAL & SURGICAL HISTORY:  Breast cancer      Mild intermittent asthma      Status post cervical polyp removal          REVIEW OF SYSTEMS  Denies fevers, chills or sweats  Denies N/V/Abd pain; denies diarrhea  Denies c/o rectal bleeding or melena, hematemesis or hemoptyis  Denies dysuria or hematuria  Denies bone pain  RUE is swollen      Allergic/Immunologic:	    MEDICATIONS  (STANDING):  enoxaparin Injectable 40 milliGRAM(s) SubCutaneous every 24 hours  piperacillin/tazobactam IVPB.. 3.375 Gram(s) IV Intermittent every 8 hours    MEDICATIONS  (PRN):  acetaminophen     Tablet .. 650 milliGRAM(s) Oral every 6 hours PRN Temp greater or equal to 38C (100.4F), Mild Pain (1 - 3)  aluminum hydroxide/magnesium hydroxide/simethicone Suspension 30 milliLiter(s) Oral every 4 hours PRN Dyspepsia  clonazePAM  Tablet 0.5 milliGRAM(s) Oral at bedtime PRN anxiety  melatonin 3 milliGRAM(s) Oral at bedtime PRN Insomnia  ondansetron Injectable 4 milliGRAM(s) IV Push every 8 hours PRN Nausea and/or Vomiting      Allergies    No Known Allergies    Intolerances        SOCIAL HISTORY:    Smoking Status:denied  Alcohol:denied    FAMILY HISTORY:  FH: breast cancer  cousin          Vital Signs Last 24 Hrs  T(C): 36.8 (09 Jul 2022 16:17), Max: 36.9 (08 Jul 2022 20:03)  T(F): 98.3 (09 Jul 2022 16:17), Max: 98.4 (08 Jul 2022 20:03)  HR: 107 (09 Jul 2022 16:17) (97 - 107)  BP: 128/77 (09 Jul 2022 16:17) (123/76 - 151/82)  BP(mean): 114 (08 Jul 2022 20:03) (114 - 114)  RR: 18 (09 Jul 2022 16:17) (16 - 18)  SpO2: 96% (09 Jul 2022 16:17) (92% - 98%)    Parameters below as of 09 Jul 2022 16:17  Patient On (Oxygen Delivery Method): nasal cannula  O2 Flow (L/min): 2      PHYSICAL EXAM:      Constitutional:Obese, awake, alert, comfortable    Eyes:anicteric      Neck:pos right adnopathy/axillary adenopathy on right    Breasts:right breast with large necrotic breast lesion, minimal oozing    Respiratory:clear    Cardiovascular:RRR normal S1S2    Gastrointestinal:soft, non-distended, nontender      Extremities:RUE lymphedema  Lymph Nodes: right axillary, supraclav        LABS:                        8.7    12.25 )-----------( 413      ( 09 Jul 2022 05:15 )             28.6     07-09    136  |  100  |  10.9  ----------------------------<  168<H>  4.3   |  25.0  |  0.78    Ca    8.6      09 Jul 2022 05:15    TPro  6.3<L>  /  Alb  3.0<L>  /  TBili  <0.2<L>  /  DBili  x   /  AST  20  /  ALT  17  /  AlkPhos  61  07-09    PT/INR - ( 08 Jul 2022 18:19 )   PT: 13.6 sec;   INR: 1.17 ratio         PTT - ( 08 Jul 2022 18:19 )  PTT:31.6 sec      RADIOLOGY & ADDITIONAL STUDIES:

## 2022-07-09 NOTE — H&P ADULT - HISTORY OF PRESENT ILLNESS
38 y/o female with PMH of right breast ca was sent to the ED from chemo center for shortness of breath. Patient said she went for her first chemo treatment today, she got immunotherapy, after started feeling "hot", became short of breath, was placed on oxygen but her saturation did not improve. Did not get chemotherapy, was sent to the ED for evaluation. She has no chest pain, cough, palpitation, nausea, vomiting, abdominal pain, change in bowel/urinary habit.

## 2022-07-09 NOTE — PATIENT PROFILE ADULT - STATED REASON FOR ADMISSION
Pt. was at [facility] receiving her first chemo therapy dose when she started to become SOB and felt hot. Pt. SOB was not improving with supplemental O2 and was sent to the ED.

## 2022-07-09 NOTE — CHART NOTE - NSCHARTNOTEFT_GEN_A_CORE
H&P, labs/imaging reviewed    comfortable on NC, dec bs right 1/2 chest wall no wheezing  CT surgery following for possible chest tube placement   would continue iv abx for possible post obstructive pna but dc if cultures negative

## 2022-07-09 NOTE — ED ADULT NURSE REASSESSMENT NOTE - NS ED NURSE REASSESS COMMENT FT1
Pt is A&OX4, pt is being transferred to CDU Bed 4R and report given to Rodolfo MONTALVO RN, pts current condition, medical history and reason for admission reviewed, Left AC IV site is clean/dry/intact, Pt is ambulatory with assistance, pt is not in any pain or distress at this time, pt is aware of the transfer, the need for the transfer and acclimated to the new unit, provided the call button, personal items within reach, bed is in lowest position, wheels are locked, pt VS recorded and placed in the EMR, pt education deemed successful after teach back shows proficiency.

## 2022-07-10 LAB
ALBUMIN SERPL ELPH-MCNC: 3.1 G/DL — LOW (ref 3.3–5.2)
ALP SERPL-CCNC: 57 U/L — SIGNIFICANT CHANGE UP (ref 40–120)
ALT FLD-CCNC: 20 U/L — SIGNIFICANT CHANGE UP
ANION GAP SERPL CALC-SCNC: 12 MMOL/L — SIGNIFICANT CHANGE UP (ref 5–17)
AST SERPL-CCNC: 27 U/L — SIGNIFICANT CHANGE UP
BASOPHILS # BLD AUTO: 0.04 K/UL — SIGNIFICANT CHANGE UP (ref 0–0.2)
BASOPHILS NFR BLD AUTO: 0.6 % — SIGNIFICANT CHANGE UP (ref 0–2)
BILIRUB SERPL-MCNC: <0.2 MG/DL — LOW (ref 0.4–2)
BUN SERPL-MCNC: 16.6 MG/DL — SIGNIFICANT CHANGE UP (ref 8–20)
CALCIUM SERPL-MCNC: 8.4 MG/DL — LOW (ref 8.6–10.2)
CHLORIDE SERPL-SCNC: 103 MMOL/L — SIGNIFICANT CHANGE UP (ref 98–107)
CO2 SERPL-SCNC: 25 MMOL/L — SIGNIFICANT CHANGE UP (ref 22–29)
CREAT SERPL-MCNC: 0.92 MG/DL — SIGNIFICANT CHANGE UP (ref 0.5–1.3)
EGFR: 82 ML/MIN/1.73M2 — SIGNIFICANT CHANGE UP
EOSINOPHIL # BLD AUTO: 0.13 K/UL — SIGNIFICANT CHANGE UP (ref 0–0.5)
EOSINOPHIL NFR BLD AUTO: 1.8 % — SIGNIFICANT CHANGE UP (ref 0–6)
GLUCOSE SERPL-MCNC: 100 MG/DL — HIGH (ref 70–99)
HCT VFR BLD CALC: 29.2 % — LOW (ref 34.5–45)
HGB BLD-MCNC: 8.6 G/DL — LOW (ref 11.5–15.5)
IMM GRANULOCYTES NFR BLD AUTO: 1 % — SIGNIFICANT CHANGE UP (ref 0–1.5)
LYMPHOCYTES # BLD AUTO: 0.61 K/UL — LOW (ref 1–3.3)
LYMPHOCYTES # BLD AUTO: 8.5 % — LOW (ref 13–44)
MCHC RBC-ENTMCNC: 25.5 PG — LOW (ref 27–34)
MCHC RBC-ENTMCNC: 29.5 GM/DL — LOW (ref 32–36)
MCV RBC AUTO: 86.6 FL — SIGNIFICANT CHANGE UP (ref 80–100)
MONOCYTES # BLD AUTO: 0.43 K/UL — SIGNIFICANT CHANGE UP (ref 0–0.9)
MONOCYTES NFR BLD AUTO: 6 % — SIGNIFICANT CHANGE UP (ref 2–14)
NEUTROPHILS # BLD AUTO: 5.86 K/UL — SIGNIFICANT CHANGE UP (ref 1.8–7.4)
NEUTROPHILS NFR BLD AUTO: 82.1 % — HIGH (ref 43–77)
PLATELET # BLD AUTO: 394 K/UL — SIGNIFICANT CHANGE UP (ref 150–400)
POTASSIUM SERPL-MCNC: 4.7 MMOL/L — SIGNIFICANT CHANGE UP (ref 3.5–5.3)
POTASSIUM SERPL-SCNC: 4.7 MMOL/L — SIGNIFICANT CHANGE UP (ref 3.5–5.3)
PROT SERPL-MCNC: 6.1 G/DL — LOW (ref 6.6–8.7)
RBC # BLD: 3.37 M/UL — LOW (ref 3.8–5.2)
RBC # FLD: 15.2 % — HIGH (ref 10.3–14.5)
SODIUM SERPL-SCNC: 140 MMOL/L — SIGNIFICANT CHANGE UP (ref 135–145)
WBC # BLD: 7.14 K/UL — SIGNIFICANT CHANGE UP (ref 3.8–10.5)
WBC # FLD AUTO: 7.14 K/UL — SIGNIFICANT CHANGE UP (ref 3.8–10.5)

## 2022-07-10 PROCEDURE — 32556 INSERT CATH PLEURA W/O IMAGE: CPT

## 2022-07-10 PROCEDURE — 99232 SBSQ HOSP IP/OBS MODERATE 35: CPT | Mod: 25

## 2022-07-10 PROCEDURE — 99233 SBSQ HOSP IP/OBS HIGH 50: CPT

## 2022-07-10 PROCEDURE — 71045 X-RAY EXAM CHEST 1 VIEW: CPT | Mod: 26

## 2022-07-10 RX ORDER — OXYCODONE AND ACETAMINOPHEN 5; 325 MG/1; MG/1
2 TABLET ORAL EVERY 6 HOURS
Refills: 0 | Status: DISCONTINUED | OUTPATIENT
Start: 2022-07-10 | End: 2022-07-12

## 2022-07-10 RX ORDER — TRAMADOL HYDROCHLORIDE 50 MG/1
25 TABLET ORAL ONCE
Refills: 0 | Status: DISCONTINUED | OUTPATIENT
Start: 2022-07-10 | End: 2022-07-10

## 2022-07-10 RX ORDER — OXYCODONE AND ACETAMINOPHEN 5; 325 MG/1; MG/1
1 TABLET ORAL EVERY 4 HOURS
Refills: 0 | Status: DISCONTINUED | OUTPATIENT
Start: 2022-07-10 | End: 2022-07-12

## 2022-07-10 RX ADMIN — Medication 0.5 MILLIGRAM(S): at 00:34

## 2022-07-10 RX ADMIN — Medication 650 MILLIGRAM(S): at 14:38

## 2022-07-10 RX ADMIN — ENOXAPARIN SODIUM 40 MILLIGRAM(S): 100 INJECTION SUBCUTANEOUS at 13:33

## 2022-07-10 RX ADMIN — Medication 650 MILLIGRAM(S): at 18:45

## 2022-07-10 RX ADMIN — PIPERACILLIN AND TAZOBACTAM 25 GRAM(S): 4; .5 INJECTION, POWDER, LYOPHILIZED, FOR SOLUTION INTRAVENOUS at 06:23

## 2022-07-10 RX ADMIN — Medication 650 MILLIGRAM(S): at 07:30

## 2022-07-10 RX ADMIN — Medication 650 MILLIGRAM(S): at 06:23

## 2022-07-10 RX ADMIN — TRAMADOL HYDROCHLORIDE 25 MILLIGRAM(S): 50 TABLET ORAL at 23:34

## 2022-07-10 NOTE — PROGRESS NOTE ADULT - PROBLEM SELECTOR PLAN 2
Right pigtail placed to water seal.   Currently clamped after drainage of 700 cc's.  Plan to unclamp at 11.  Pleural fluid pathology from previous drainage showed malignant cells consistent with her breast cancer diagnosis. No new samples sent at this time.   Plan to maintain tube to water seal  Evaluate AM xray.  Thoracic surgery will continue to follow.  Plan discussed with Dr. Gill

## 2022-07-10 NOTE — PROGRESS NOTE ADULT - SUBJECTIVE AND OBJECTIVE BOX
HPI: Patient is a 37y Female seen on consultation for the evaluation and management of locally advanced, likely metastatic breast cancer, Her-2 pos.  Pt reports PMHx PCOS.  Recently diagnosed with locally advanced breast cancer-will obtain details.  Just had port placed and began chemotherapy in office yesterday with Taxol/Perjeta/ Herceptin.  Reports reaction to Herceptin with desaturation  sent to ER.  Has large nodular  loculated pleural effusion with partial collapse of RUL/lower lobes, collapse RML.  Seen by thoracic surgery-awaiting input.   Pt is currently breathing comfortable with oxygen via N/C.  Denies SOB, chest pain, headaches or dizziness, fevers  chills or sweats, nausea or vomiting.  No PE    Had pigtail catheter chest tube placed today by thoracic surgery, removed 700 cc, malign effusion; no won waterseal  Breathing comfortably on RA with no new complaints.     PAST MEDICAL & SURGICAL HISTORY:  Breast cancer      Mild intermittent asthma      Status post cervical polyp removal          REVIEW OF SYSTEMS  Denies fevers, chills or sweats  Denies N/V/Abd pain; denies diarrhea  Denies c/o rectal bleeding or melena, hematemesis or hemoptyis  Denies dysuria or hematuria  Denies bone pain  RUE is swollen      Allergic/Immunologic:	    MEDICATIONS  (STANDING):  enoxaparin Injectable 40 milliGRAM(s) SubCutaneous every 24 hours  piperacillin/tazobactam IVPB.. 3.375 Gram(s) IV Intermittent every 8 hours    MEDICATIONS  (PRN):  acetaminophen     Tablet .. 650 milliGRAM(s) Oral every 6 hours PRN Temp greater or equal to 38C (100.4F), Mild Pain (1 - 3)  aluminum hydroxide/magnesium hydroxide/simethicone Suspension 30 milliLiter(s) Oral every 4 hours PRN Dyspepsia  clonazePAM  Tablet 0.5 milliGRAM(s) Oral at bedtime PRN anxiety  melatonin 3 milliGRAM(s) Oral at bedtime PRN Insomnia  ondansetron Injectable 4 milliGRAM(s) IV Push every 8 hours PRN Nausea and/or Vomiting      Allergies    No Known Allergies    Intolerances        SOCIAL HISTORY:    Smoking Status:denied  Alcohol:denied    FAMILY HISTORY:  FH: breast cancer  cousin          Vital Signs Last 24 Hrs  T(C): 36.8 (09 Jul 2022 16:17), Max: 36.9 (08 Jul 2022 20:03)  T(F): 98.3 (09 Jul 2022 16:17), Max: 98.4 (08 Jul 2022 20:03)  HR: 107 (09 Jul 2022 16:17) (97 - 107)  BP: 128/77 (09 Jul 2022 16:17) (123/76 - 151/82)  BP(mean): 114 (08 Jul 2022 20:03) (114 - 114)  RR: 18 (09 Jul 2022 16:17) (16 - 18)  SpO2: 96% (09 Jul 2022 16:17) (92% - 98%)    Parameters below as of 09 Jul 2022 16:17  Patient On (Oxygen Delivery Method): nasal cannula  O2 Flow (L/min): 2      PHYSICAL EXAM:      Constitutional:Obese, awake, alert, comfortable    Eyes:anicteric      Neck:pos right adnopathy/axillary adenopathy on right    Breasts:right breast with large necrotic breast lesion, minimal oozing    Respiratory:clear    Cardiovascular:RRR normal S1S2    Gastrointestinal:soft, non-distended, nontender      Extremities:RUE lymphedema  Lymph Nodes: right axillary, supraclav        LABS:        WBC 7.14  H/H 8.6/29.2  plt ct 394,000                  8.7    12.25 )-----------( 413      ( 09 Jul 2022 05:15 )             28.6     07-09    136  |  100  |  10.9  ----------------------------<  168<H>  4.3   |  25.0  |  0.78    Ca    8.6      09 Jul 2022 05:15    TPro  6.3<L>  /  Alb  3.0<L>  /  TBili  <0.2<L>  /  DBili  x   /  AST  20  /  ALT  17  /  AlkPhos  61  07-09    PT/INR - ( 08 Jul 2022 18:19 )   PT: 13.6 sec;   INR: 1.17 ratio         PTT - ( 08 Jul 2022 18:19 )  PTT:31.6 sec      RADIOLOGY & ADDITIONAL STUDIES:

## 2022-07-10 NOTE — PROGRESS NOTE ADULT - SUBJECTIVE AND OBJECTIVE BOX
post chest tube placement  feels well  some pain at incision site  ros negative     MEDICATIONS  (STANDING):  enoxaparin Injectable 40 milliGRAM(s) SubCutaneous every 24 hours    MEDICATIONS  (PRN):  acetaminophen     Tablet .. 650 milliGRAM(s) Oral every 6 hours PRN Temp greater or equal to 38C (100.4F), Mild Pain (1 - 3)  aluminum hydroxide/magnesium hydroxide/simethicone Suspension 30 milliLiter(s) Oral every 4 hours PRN Dyspepsia  clonazePAM  Tablet 0.5 milliGRAM(s) Oral at bedtime PRN anxiety  melatonin 3 milliGRAM(s) Oral at bedtime PRN Insomnia  ondansetron Injectable 4 milliGRAM(s) IV Push every 8 hours PRN Nausea and/or Vomiting  oxycodone    5 mG/acetaminophen 325 mG 1 Tablet(s) Oral every 4 hours PRN Moderate Pain (4 - 6)  oxycodone    5 mG/acetaminophen 325 mG 2 Tablet(s) Oral every 6 hours PRN Severe Pain (7 - 10)      Allergies    No Known Allergies        Vital Signs Last 24 Hrs  T(C): 36.7 (10 Jul 2022 11:34), Max: 37.1 (09 Jul 2022 20:55)  T(F): 98.1 (10 Jul 2022 11:34), Max: 98.7 (09 Jul 2022 20:55)  HR: 93 (10 Jul 2022 11:34) (91 - 107)  BP: 139/83 (10 Jul 2022 11:34) (121/77 - 139/83)  BP(mean): --  RR: 18 (10 Jul 2022 11:34) (18 - 19)  SpO2: 95% (10 Jul 2022 11:34) (95% - 96%)    Parameters below as of 10 Jul 2022 11:34  Patient On (Oxygen Delivery Method): nasal cannula  O2 Flow (L/min): 2      PHYSICAL EXAM:    GENERAL: NAD  CHEST/LUNG: dec bs at right base  HEART: Regular rate and rhythm; S1 S2  ABDOMEN: Soft, Nontender, Bowel sounds present  EXTREMITIES:  no edema   NERVOUS SYSTEM:  Alert & Oriented X3, Motor Strength 5/5 B/L upper and lower extremities  PSYCH: normal mood, appropriate response.    LABS:                        8.6    7.14  )-----------( 394      ( 10 Jul 2022 06:51 )             29.2     07-10    140  |  103  |  16.6  ----------------------------<  100<H>  4.7   |  25.0  |  0.92    Ca    8.4<L>      10 Jul 2022 06:51    TPro  6.1<L>  /  Alb  3.1<L>  /  TBili  <0.2<L>  /  DBili  x   /  AST  27  /  ALT  20  /  AlkPhos  57  07-10    PT/INR - ( 08 Jul 2022 18:19 )   PT: 13.6 sec;   INR: 1.17 ratio         PTT - ( 08 Jul 2022 18:19 )  PTT:31.6 sec      CAPILLARY BLOOD GLUCOSE            RADIOLOGY & ADDITIONAL TESTS:

## 2022-07-10 NOTE — PROCEDURE NOTE - NSINFORMCONSENT_GEN_A_CORE
Tried calling pt to schedule a 4 month well child exam. Called the p/f guarantor mobile phone # because no phone numbers listed in demographics. They stated this was not the correct #   Benefits, risks, and possible complications of procedure explained to patient/caregiver who verbalized understanding and gave written consent.

## 2022-07-10 NOTE — PROGRESS NOTE ADULT - SUBJECTIVE AND OBJECTIVE BOX
Patient seen and examined.  Currently denies SOB.  Gets SOB after walking.  Right Pigtail catheter placed at bedside without complication.      T(C): 36.4 (07-10-22 @ 08:09)  T(F): 97.6 (07-10-22 @ 08:09)  HR: 95 (07-10-22 @ 08:09)  BP: 125/77 (07-10-22 @ 08:09)    RR: 18 (07-10-22 @ 08:09)  SpO2: 95% (07-10-22 @ 08:09)        Physical Exam:  Obese  Gen: A&Ox3  Pulm: Decreased right side  CV:  S1S2, RRR, no m/r/g  Right breast with open wound breast CA.   Abd: +BS, soft, NT, ND  Ext:  +DP b/l, no c/c/e      I&O's Detail                          8.6    7.14  )-----------( 394      ( 10 Jul 2022 06:51 )             29.2   07-10    140  |  103  |  16.6  ----------------------------<  100<H>  4.7   |  25.0  |  0.92    Ca    8.4<L>      10 Jul 2022 06:51    TPro  6.1<L>  /  Alb  3.1<L>  /  TBili  <0.2<L>  /  DBili  x   /  AST  27  /  ALT  20  /  AlkPhos  57  07-10  aPTT: 31.6 sec; PT: 13.6 sec; INR: 1.17 ratio  07-08-22 @ 18:19         CAPILLARY BLOOD GLUCOSE            Medications:  acetaminophen     Tablet .. 650 milliGRAM(s) Oral every 6 hours PRN  aluminum hydroxide/magnesium hydroxide/simethicone Suspension 30 milliLiter(s) Oral every 4 hours PRN  clonazePAM  Tablet 0.5 milliGRAM(s) Oral at bedtime PRN  enoxaparin Injectable 40 milliGRAM(s) SubCutaneous every 24 hours  melatonin 3 milliGRAM(s) Oral at bedtime PRN  ondansetron Injectable 4 milliGRAM(s) IV Push every 8 hours PRN  piperacillin/tazobactam IVPB.. 3.375 Gram(s) IV Intermittent every 8 hours        Final Diagnosis   PLEURAL FLUID, RIGHT   POSITIVE FOR MALIGNANT CELLS.   Metastatic adenocarcinoma, cytomorphologically and immunophenotypically   consistent with patient's known primary mammary carcinoma.   Cytology slides and cell block Â reveal a hypercellular specimen   composed of crowded groups and single-lying malignant cells displaying   anisonucleosis, irregular chromatin, vacuolated cytoplasm and prominent   nucleoli.   Immunostains show that the neoplastic cells are positive for CK 7, TODD 3   and focally/ weakly for TRPS1. TTF1 is negative.   Slide(s) with built in immunohistochemical study control(s) associated and   negative control with this case has/have been verified by the sign out   pathologist.   For slide(s) without built in controls positive control slides has/have   been reviewed and approved by immunohistochemistry lab   These immunohistochemical/ in-situ hybridization tests have been   developed and their performance characteristics determined by [facility] /   [facility], Department of Pathology, Division of   Immunopathology, [facility] [place] [address]. It has   not been cleared or approved by the U.S. Food and Drug Administration.   The FDA has determined that such clearance or approval is not necessary.   This test is used for clinical purposes. The laboratory is certified   under the CLIA-88 as qualified to perform high complexity clinical   testing.

## 2022-07-11 LAB
ANION GAP SERPL CALC-SCNC: 9 MMOL/L — SIGNIFICANT CHANGE UP (ref 5–17)
BUN SERPL-MCNC: 17.5 MG/DL — SIGNIFICANT CHANGE UP (ref 8–20)
CALCIUM SERPL-MCNC: 8.4 MG/DL — LOW (ref 8.6–10.2)
CHLORIDE SERPL-SCNC: 100 MMOL/L — SIGNIFICANT CHANGE UP (ref 98–107)
CO2 SERPL-SCNC: 28 MMOL/L — SIGNIFICANT CHANGE UP (ref 22–29)
CREAT SERPL-MCNC: 0.86 MG/DL — SIGNIFICANT CHANGE UP (ref 0.5–1.3)
EGFR: 89 ML/MIN/1.73M2 — SIGNIFICANT CHANGE UP
GLUCOSE SERPL-MCNC: 104 MG/DL — HIGH (ref 70–99)
HCT VFR BLD CALC: 28.4 % — LOW (ref 34.5–45)
HGB BLD-MCNC: 8.4 G/DL — LOW (ref 11.5–15.5)
MCHC RBC-ENTMCNC: 25.8 PG — LOW (ref 27–34)
MCHC RBC-ENTMCNC: 29.6 GM/DL — LOW (ref 32–36)
MCV RBC AUTO: 87.1 FL — SIGNIFICANT CHANGE UP (ref 80–100)
PLATELET # BLD AUTO: 396 K/UL — SIGNIFICANT CHANGE UP (ref 150–400)
POTASSIUM SERPL-MCNC: 4.3 MMOL/L — SIGNIFICANT CHANGE UP (ref 3.5–5.3)
POTASSIUM SERPL-SCNC: 4.3 MMOL/L — SIGNIFICANT CHANGE UP (ref 3.5–5.3)
RBC # BLD: 3.26 M/UL — LOW (ref 3.8–5.2)
RBC # FLD: 15.2 % — HIGH (ref 10.3–14.5)
SODIUM SERPL-SCNC: 137 MMOL/L — SIGNIFICANT CHANGE UP (ref 135–145)
WBC # BLD: 6.47 K/UL — SIGNIFICANT CHANGE UP (ref 3.8–10.5)
WBC # FLD AUTO: 6.47 K/UL — SIGNIFICANT CHANGE UP (ref 3.8–10.5)

## 2022-07-11 PROCEDURE — 99232 SBSQ HOSP IP/OBS MODERATE 35: CPT

## 2022-07-11 PROCEDURE — 99231 SBSQ HOSP IP/OBS SF/LOW 25: CPT

## 2022-07-11 PROCEDURE — 71045 X-RAY EXAM CHEST 1 VIEW: CPT | Mod: 26

## 2022-07-11 RX ADMIN — ENOXAPARIN SODIUM 40 MILLIGRAM(S): 100 INJECTION SUBCUTANEOUS at 13:31

## 2022-07-11 RX ADMIN — Medication 650 MILLIGRAM(S): at 06:26

## 2022-07-11 RX ADMIN — TRAMADOL HYDROCHLORIDE 25 MILLIGRAM(S): 50 TABLET ORAL at 01:49

## 2022-07-11 NOTE — PROGRESS NOTE ADULT - PROBLEM SELECTOR PLAN 2
Right pigtail placed to water seal.   Clamped after initially draining 700 cc's.  Unclamped lastnight and has drained a total of 2780ml.  Pleural fluid pathology from previous drainage showed malignant cells consistent with her breast cancer diagnosis. No new samples sent at this time.   Plan to maintain tube to water seal  CXR in AM.  Discussed and offered a pleurx catheter.  Pt not amendable to pleurx catheter at this time.  She would like for this tube to come out when the drainage slows and to be able to go home to continue her chemotherapy.  Thoracic surgery will continue to follow.  Plan discussed with Dr. Loving

## 2022-07-11 NOTE — PROGRESS NOTE ADULT - SUBJECTIVE AND OBJECTIVE BOX
Subjective: Pt sitting up in bed .  NAD noted.                         T(F): 98.9 (07-11-22 @ 11:28), Max: 99 (07-10-22 @ 19:17)  HR: 90 (07-11-22 @ 11:28) (82 - 96)  BP: 128/91 (07-11-22 @ 11:28) (122/77 - 145/77)  RR: 18 (07-11-22 @ 11:28) (16 - 18)  SpO2: 95% (07-11-22 @ 11:28) (92% - 98%)    No Known Allergies      07-11    137  |  100  |  17.5  ----------------------------<  104<H>  4.3   |  28.0  |  0.86    Ca    8.4<L>      11 Jul 2022 03:17    TPro  6.1<L>  /  Alb  3.1<L>  /  TBili  <0.2<L>  /  DBili  x   /  AST  27  /  ALT  20  /  AlkPhos  57  07-10                               8.4    6.47  )-----------( 396      ( 11 Jul 2022 03:17 )             28.4          CXR: pending read from radiology    I&O's Detail    10 Jul 2022 07:01  -  11 Jul 2022 07:00  --------------------------------------------------------  IN:  Total IN: 0 mL    OUT:    Chest Tube (mL): 2780 mL  Total OUT: 2780 mL    Total NET: -2780 mL      CHEST TUBE:  [x ] YES [ ] NO  OUTPUT: 280ml overnight and 2780ml over the last 24 hours.    AIR LEAKS:  [ ] YES [ x] NO        Active Medications:  acetaminophen     Tablet .. 650 milliGRAM(s) Oral every 6 hours PRN  aluminum hydroxide/magnesium hydroxide/simethicone Suspension 30 milliLiter(s) Oral every 4 hours PRN  clonazePAM  Tablet 0.5 milliGRAM(s) Oral at bedtime PRN  enoxaparin Injectable 40 milliGRAM(s) SubCutaneous every 24 hours  melatonin 3 milliGRAM(s) Oral at bedtime PRN  ondansetron Injectable 4 milliGRAM(s) IV Push every 8 hours PRN  oxycodone    5 mG/acetaminophen 325 mG 1 Tablet(s) Oral every 4 hours PRN  oxycodone    5 mG/acetaminophen 325 mG 2 Tablet(s) Oral every 6 hours PRN      Physical Exam:    Neuro: AAOx3.    Pulm: Diminished BLL.    CV: RRR.      Abd: Soft/NT.    Extremities: No edema.  +pp.                    PAST MEDICAL & SURGICAL HISTORY:  Breast cancer      Mild intermittent asthma      Status post cervical polyp removal

## 2022-07-11 NOTE — PROGRESS NOTE ADULT - SUBJECTIVE AND OBJECTIVE BOX
no acute complaints  off o2  feels well  ros negative     MEDICATIONS  (STANDING):  enoxaparin Injectable 40 milliGRAM(s) SubCutaneous every 24 hours    MEDICATIONS  (PRN):  acetaminophen     Tablet .. 650 milliGRAM(s) Oral every 6 hours PRN Temp greater or equal to 38C (100.4F), Mild Pain (1 - 3)  aluminum hydroxide/magnesium hydroxide/simethicone Suspension 30 milliLiter(s) Oral every 4 hours PRN Dyspepsia  clonazePAM  Tablet 0.5 milliGRAM(s) Oral at bedtime PRN anxiety  melatonin 3 milliGRAM(s) Oral at bedtime PRN Insomnia  ondansetron Injectable 4 milliGRAM(s) IV Push every 8 hours PRN Nausea and/or Vomiting  oxycodone    5 mG/acetaminophen 325 mG 1 Tablet(s) Oral every 4 hours PRN Moderate Pain (4 - 6)  oxycodone    5 mG/acetaminophen 325 mG 2 Tablet(s) Oral every 6 hours PRN Severe Pain (7 - 10)      Allergies    No Known Allergies      Vital Signs Last 24 Hrs  T(C): 37.2 (11 Jul 2022 11:28), Max: 37.2 (10 Jul 2022 19:17)  T(F): 98.9 (11 Jul 2022 11:28), Max: 99 (10 Jul 2022 19:17)  HR: 90 (11 Jul 2022 11:28) (82 - 96)  BP: 128/91 (11 Jul 2022 11:28) (122/77 - 145/77)  BP(mean): --  RR: 18 (11 Jul 2022 11:28) (16 - 18)  SpO2: 95% (11 Jul 2022 11:28) (92% - 98%)    Parameters below as of 11 Jul 2022 11:28  Patient On (Oxygen Delivery Method): room air        PHYSICAL EXAM:    GENERAL: NAD  CHEST/LUNG: dec bs right base, improved ;  rt pigtail   HEART: Regular rate and rhythm; S1 S2  ABDOMEN: Soft, Nontender, Bowel sounds present  EXTREMITIES: no le pitting edema   NERVOUS SYSTEM:  Alert & Oriented X3,  Motor Strength 5/5 B/L upper and lower extremities  PSYCH: normal mood, appropriate response.    LABS:                        8.4    6.47  )-----------( 396      ( 11 Jul 2022 03:17 )             28.4     07-11    137  |  100  |  17.5  ----------------------------<  104<H>  4.3   |  28.0  |  0.86    Ca    8.4<L>      11 Jul 2022 03:17    TPro  6.1<L>  /  Alb  3.1<L>  /  TBili  <0.2<L>  /  DBili  x   /  AST  27  /  ALT  20  /  AlkPhos  57  07-10          CAPILLARY BLOOD GLUCOSE            RADIOLOGY & ADDITIONAL TESTS:

## 2022-07-12 ENCOUNTER — TRANSCRIPTION ENCOUNTER (OUTPATIENT)
Age: 37
End: 2022-07-12

## 2022-07-12 VITALS
SYSTOLIC BLOOD PRESSURE: 133 MMHG | OXYGEN SATURATION: 95 % | HEART RATE: 84 BPM | TEMPERATURE: 98 F | DIASTOLIC BLOOD PRESSURE: 86 MMHG | RESPIRATION RATE: 20 BRPM

## 2022-07-12 LAB
ANION GAP SERPL CALC-SCNC: 10 MMOL/L — SIGNIFICANT CHANGE UP (ref 5–17)
BUN SERPL-MCNC: 11.8 MG/DL — SIGNIFICANT CHANGE UP (ref 8–20)
CALCIUM SERPL-MCNC: 8.4 MG/DL — LOW (ref 8.6–10.2)
CHLORIDE SERPL-SCNC: 104 MMOL/L — SIGNIFICANT CHANGE UP (ref 98–107)
CO2 SERPL-SCNC: 27 MMOL/L — SIGNIFICANT CHANGE UP (ref 22–29)
CREAT SERPL-MCNC: 0.71 MG/DL — SIGNIFICANT CHANGE UP (ref 0.5–1.3)
EGFR: 112 ML/MIN/1.73M2 — SIGNIFICANT CHANGE UP
GLUCOSE SERPL-MCNC: 111 MG/DL — HIGH (ref 70–99)
HCT VFR BLD CALC: 28.7 % — LOW (ref 34.5–45)
HGB BLD-MCNC: 8.6 G/DL — LOW (ref 11.5–15.5)
MCHC RBC-ENTMCNC: 26 PG — LOW (ref 27–34)
MCHC RBC-ENTMCNC: 30 GM/DL — LOW (ref 32–36)
MCV RBC AUTO: 86.7 FL — SIGNIFICANT CHANGE UP (ref 80–100)
PLATELET # BLD AUTO: 412 K/UL — HIGH (ref 150–400)
POTASSIUM SERPL-MCNC: 4.2 MMOL/L — SIGNIFICANT CHANGE UP (ref 3.5–5.3)
POTASSIUM SERPL-SCNC: 4.2 MMOL/L — SIGNIFICANT CHANGE UP (ref 3.5–5.3)
RBC # BLD: 3.31 M/UL — LOW (ref 3.8–5.2)
RBC # FLD: 15.1 % — HIGH (ref 10.3–14.5)
SODIUM SERPL-SCNC: 141 MMOL/L — SIGNIFICANT CHANGE UP (ref 135–145)
WBC # BLD: 5.98 K/UL — SIGNIFICANT CHANGE UP (ref 3.8–10.5)
WBC # FLD AUTO: 5.98 K/UL — SIGNIFICANT CHANGE UP (ref 3.8–10.5)

## 2022-07-12 PROCEDURE — 71045 X-RAY EXAM CHEST 1 VIEW: CPT | Mod: 26

## 2022-07-12 PROCEDURE — 99231 SBSQ HOSP IP/OBS SF/LOW 25: CPT

## 2022-07-12 PROCEDURE — 99239 HOSP IP/OBS DSCHRG MGMT >30: CPT

## 2022-07-12 RX ADMIN — Medication 650 MILLIGRAM(S): at 00:46

## 2022-07-12 RX ADMIN — Medication 650 MILLIGRAM(S): at 01:16

## 2022-07-12 NOTE — PROGRESS NOTE ADULT - PROBLEM SELECTOR PLAN 2
Right pigtail placed to water seal.   Pleural fluid pathology from previous drainage showed malignant cells consistent with her breast cancer diagnosis. No new samples sent at this time.   Plan to maintain tube to water seal  CXR each AM while chest tube is in place.  Discussed and offered a pleurx catheter.  Pt not amendable to pleurx catheter at this time.  She would like for this tube to come out when the drainage slows and to be able to go home to continue her chemotherapy.  Will plan to d/c tube on day of discharge.   Thoracic surgery will continue to follow.  Plan discussed with Dr. Loving Right pigtail placed to water seal.   Pleural fluid pathology from previous drainage showed malignant cells consistent with her breast cancer diagnosis. No new samples sent at this time.   CXR each AM while chest tube is in place.  Discussed and offered a pleurx catheter.  Pt not amendable to pleurx catheter at this time.  She would like for this tube to come out when the drainage slows and to be able to go home to continue her chemotherapy.  Will plan to d/c tube on day of discharge.   CXR s/p removal of chest tube must be evaluated prior to discharge    Thoracic surgery will continue to follow.  Plan discussed with Dr. Loving Right pigtail placed to water seal.   Pleural fluid pathology from previous drainage showed malignant cells consistent with her breast cancer diagnosis. No new samples sent at this time.   CXR each AM while chest tube is in place.  Discussed and offered a pleurx catheter.  Pt not amendable to pleurx catheter at this time.  She would like for this tube to come out when the drainage slows and to be able to go home to continue her chemotherapy.  Will plan to d/c tube on day of discharge.   Please have pt f/u with Dr. Loving in office in 1 week  CXR s/p removal of chest tube must be evaluated prior to discharge    Thoracic surgery will continue to follow.  Plan discussed with Dr. Loving Right pigtail placed to water seal.   Pleural fluid pathology from previous drainage showed malignant cells consistent with her breast cancer diagnosis. No new samples sent at this time.   CXR each AM while chest tube is in place.  Discussed and offered a pleurx catheter.  Pt not amendable to pleurx catheter at this time.  She would like for this tube to come out when the drainage slows and to be able to go home to continue her chemotherapy.  Will plan to d/c tube on day of discharge.   Please have pt f/u with Dr. Loving in office in 1 week    Thoracic surgery will continue to follow.  Plan discussed with Dr. Loving

## 2022-07-12 NOTE — DISCHARGE NOTE PROVIDER - CARE PROVIDER_API CALL
Wale Cartagena)  HematologyOncology; Internal Medicine  [address]  [place] [address]  Phone: [phone]  Fax: [phone]  Follow Up Time:     Reno Loving)  Surgery; Thoracic Surgery  [address] [place] [address]  Phone: [phone]  Fax: [phone]  Follow Up Time:   
WDL

## 2022-07-12 NOTE — DISCHARGE NOTE PROVIDER - NSDCCPCAREPLAN_GEN_ALL_CORE_FT
PRINCIPAL DISCHARGE DIAGNOSIS  Diagnosis: Pleural effusion  Assessment and Plan of Treatment: improved  follow up with your oncologist and CT surgery      SECONDARY DISCHARGE DIAGNOSES  Diagnosis: Breast cancer  Assessment and Plan of Treatment:

## 2022-07-12 NOTE — PROGRESS NOTE ADULT - PROBLEM SELECTOR PLAN 1
Care as per oncology/ primary team

## 2022-07-12 NOTE — DISCHARGE NOTE PROVIDER - NSDCMRMEDTOKEN_GEN_ALL_CORE_FT
KlonoPIN 0.5 mg oral tablet: 1 tab(s) orally once a day (at bedtime), As Needed  ProAir HFA 90 mcg/inh inhalation aerosol: 2 puff(s) inhaled every 6 hours, As Needed  traMADol 50 mg oral tablet: 1 tab(s) orally every 6 hours, As Needed  Tylenol 325 mg oral tablet: 2 tab(s) orally every 6 hours, As Needed

## 2022-07-12 NOTE — DISCHARGE NOTE NURSING/CASE MANAGEMENT/SOCIAL WORK - PATIENT PORTAL LINK FT
You can access the FollowMyHealth Patient Portal offered by [facility] by registering at the following website: [email or web address]. By joining [employer]’s FollowMyHealth portal, you will also be able to view your health information using other applications (apps) compatible with our system.

## 2022-07-12 NOTE — PROGRESS NOTE ADULT - ASSESSMENT
36 y/o female with PMH of right breast ca was sent to the ED from chemo center for shortness of breath. Patient said she went for her first chemo treatment today, she got immunotherapy, after started feeling "hot", became short of breath, was placed on oxygen but her saturation did not improve. Did not get chemotherapy, was sent to the ED for evaluation.   In the ED, patient hypoxic to 87 on RA; CT chest with angio: no PE; Large nodular and loculated right pleural effusion with partial collapse of the right upper and lower lobes and complete collapse of the right middle lobe. The aerated right lung demonstrates nearly resolved opacity in the prior study. Linear right lower lobe opacity.       Acute respiratory failure with hypoxia due to malignant R pleural effusion     ct surgery following, managing pigtail catheter    may need pleurex or pleurodesis    dc abx as blood cultures negative and no pna    percocet prn    R Breast ca  Follows with oncologist   S/p immunotherapy 07/08/22    Anxiety   Klonopin 0.5mg HS PRN     Supportive   DVT prophylaxis: Lovenox       Plan of care discussed with patient   
36 y/o female with PMH of right breast ca was sent to the ED from chemo center for shortness of breath. Patient said she went for her first chemo treatment today, she got immunotherapy, after started feeling "hot", became short of breath, was placed on oxygen but her saturation did not improve. Did not get chemotherapy, was sent to the ED for evaluation.   In the ED, patient hypoxic to 87 on RA; CT chest with angio: no PE; Large nodular and loculated right pleural effusion with partial collapse of the right upper and lower lobes and complete collapse of the right middle lobe. The aerated right lung demonstrates nearly resolved opacity in the prior study. Linear right lower lobe opacity.       Acute respiratory failure with hypoxia--resolved   malignant R pleural effusion     ct surgery following, managing pigtail catheter    may need pleurex or pleurodesis    dc abx as blood cultures negative and no pna    percocet prn    R Breast ca  Follows with oncologist   S/p immunotherapy 07/08/22    Anxiety   Klonopin 0.5mg HS PRN     Supportive   DVT prophylaxis: Lovenox     anemia: monitor h/h    Plan of care discussed with patient   
37 year old female with PMH of recently diagnosed Right Breast Cancer, with plan to start chemotherapy this week, sent to the ED from chemo center for shortness of breath. Patient said she went for her first chemo treatment 7/8, she got immunotherapy, started feeling "hot", became short of breath, was placed on oxygen without improvement in O2 saturation, and was sent to the ED. Recently admitted 6/28 - 6/30/22 for right pleural effusion s/p pigtail placement with >4L drained. 7/10 POCUS exam at bedside revealed a large pocket of pleural effusion.  Right pigtail catheter placed 7/10/22.    
Imp:  Locally advanced,  metastatic breast cancer; admitted after reported rxn to Herceptin;  hypoxic in clinic seems to have sig lung involvement.  Seen by Thoracic surgery-plan at present for observation  Breathing comfortably post pigtail CT, drained 700 cc fluid  Plans for continuation of chemotherapy thru Dr. Cartagena as outpt  Anemia-Hb 8.6 ; would keep Hb above 7
ASSESSMENT:   37 year old female with PMH of Right Breast Cancer recently diagnosed, with plan to start chemotherapy this week, sent to the ED from chemo center for shortness of breath. Patient said she went for her first chemo treatment 7/8, she got immunotherapy, started feeling "hot", became short of breath, was placed on oxygen without improvement in O2 saturation, and was sent to the ED. Recently admitted 6/28 - 6/30/22 for right pleural effusion s/p pigtail placement with >4L drained. 7/10 POCUS exam at bedside revealed a large pocket of pleural effusion.  Right pigtail catheter was successfully placed.  700 cc's drained then clamped for 1 hour.      
ASSESSMENT:   37 year old female with PMH of Right Breast Cancer recently diagnosed, with plan to start chemotherapy this week, sent to the ED from chemo center for shortness of breath. Patient said she went for her first chemo treatment 7/8, she got immunotherapy, started feeling "hot", became short of breath, was placed on oxygen without improvement in O2 saturation, and was sent to the ED. Recently admitted 6/28 - 6/30/22 for right pleural effusion s/p pigtail placement with >4L drained. 7/10 POCUS exam at bedside revealed a large pocket of pleural effusion.  Right pigtail catheter was successfully placed.  700 cc's drained then clamped for 1 hour.

## 2022-07-12 NOTE — PROGRESS NOTE ADULT - SUBJECTIVE AND OBJECTIVE BOX
Subjective:    Vital Signs:  Vital Signs Last 24 Hrs  T(C): 36.6 (07-12-22 @ 06:18), Max: 37.2 (07-11-22 @ 11:28)  T(F): 97.8 (07-12-22 @ 06:18), Max: 98.9 (07-11-22 @ 11:28)  HR: 86 (07-12-22 @ 06:18) (86 - 92)  BP: 141/80 (07-12-22 @ 06:18) (128/91 - 154/95)  RR: 20 (07-12-22 @ 06:18) (18 - 20)  SpO2: 94% (07-12-22 @ 06:18) (94% - 96%) on (O2)    Relevant labs, radiology and Medications reviewed    Pertinent Physical Exam      07-11 @ 07:01  -  07-12 @ 07:00  --------------------------------------------------------  IN:  Total IN: 0 mL    OUT:    Chest Tube (mL): 245 mL  Total OUT: 245 mL    Total NET: -245 mL             Subjective:  Pt seen and examined at bedside, pt saying "I'm ready to get this tube out of me." Pt endorses intermittent cough but attributes it to smoking. Otherwise denies fever, chills, HA, SOB, hemoptysis, wheezing, abd pain, n/v/d.     Vital Signs:  Vital Signs Last 24 Hrs  T(C): 36.6 (07-12-22 @ 06:18), Max: 37.2 (07-11-22 @ 11:28)  T(F): 97.8 (07-12-22 @ 06:18), Max: 98.9 (07-11-22 @ 11:28)  HR: 86 (07-12-22 @ 06:18) (86 - 92)  BP: 141/80 (07-12-22 @ 06:18) (128/91 - 154/95)  RR: 20 (07-12-22 @ 06:18) (18 - 20)  SpO2: 94% (07-12-22 @ 06:18) (94% - 96%) on (O2)    Relevant labs, radiology and Medications reviewed    Pertinent Physical Exam  General: obese female, NAD  Neurology: AOx3, PALMA x 4  Neck: Neck supple, trachea midline  Respiratory: CTA B/L, No wheezing, rales, rhonchi  CV: RRR, S1S2, no murmurs, rubs or gallops  Abdominal: +BS. Soft, NT, ND   Extremities: + 1 pitting edema b/l lower extremities, + peripheral pulses  Tubes: serous drainage fro right ptc       07-11 @ 07:01  -  07-12 @ 07:00  --------------------------------------------------------  IN:  Total IN: 0 mL    OUT:    Chest Tube (mL): 245 mL  Total OUT: 245 mL    Total NET: -245 mL

## 2022-07-12 NOTE — DISCHARGE NOTE PROVIDER - HOSPITAL COURSE
36 y/o female with PMH of right breast ca was sent to the ED from chemo center for shortness of breath. Patient said she went for her first chemo treatment today, she got immunotherapy, after started feeling "hot", became short of breath, was placed on oxygen but her saturation did not improve. Did not get chemotherapy, was sent to the ED for evaluation.   In the ED, patient hypoxic to 87 on RA; CT chest with angio: no PE; Large nodular and loculated right pleural effusion with partial collapse of the right upper and lower lobes and complete collapse of the right middle lobe. The aerated right lung demonstrates nearly resolved opacity in the prior study. Linear right lower lobe opacity.     s/p right pigtail placement by CT surgery. patient declines pleurex placement.  stable for dc home for oncology follow up.         36 y/o female with PMH of right breast ca was sent to the ED from chemo center for shortness of breath. Patient said she went for her first chemo treatment today, she got immunotherapy, after started feeling "hot", became short of breath, was placed on oxygen but her saturation did not improve. Did not get chemotherapy, was sent to the ED for evaluation.   In the ED, patient hypoxic to 87 on RA; CT chest with angio: no PE; Large nodular and loculated right pleural effusion with partial collapse of the right upper and lower lobes and complete collapse of the right middle lobe. The aerated right lung demonstrates nearly resolved opacity in the prior study. Linear right lower lobe opacity.     s/p right pigtail placement by CT surgery. patient declines pleurex placement. ct surg cleared for dc and chest tube discontinued.   stable for dc home for oncology follow up.

## 2022-07-14 LAB
CULTURE RESULTS: SIGNIFICANT CHANGE UP
CULTURE RESULTS: SIGNIFICANT CHANGE UP
SPECIMEN SOURCE: SIGNIFICANT CHANGE UP
SPECIMEN SOURCE: SIGNIFICANT CHANGE UP

## 2022-07-27 LAB
CULTURE RESULTS: SIGNIFICANT CHANGE UP
SPECIMEN SOURCE: SIGNIFICANT CHANGE UP

## 2022-08-11 PROCEDURE — 84100 ASSAY OF PHOSPHORUS: CPT

## 2022-08-11 PROCEDURE — C1729: CPT

## 2022-08-11 PROCEDURE — 85610 PROTHROMBIN TIME: CPT

## 2022-08-11 PROCEDURE — 85025 COMPLETE CBC W/AUTO DIFF WBC: CPT

## 2022-08-11 PROCEDURE — 83880 ASSAY OF NATRIURETIC PEPTIDE: CPT

## 2022-08-11 PROCEDURE — 80048 BASIC METABOLIC PNL TOTAL CA: CPT

## 2022-08-11 PROCEDURE — U0005: CPT

## 2022-08-11 PROCEDURE — 36415 COLL VENOUS BLD VENIPUNCTURE: CPT

## 2022-08-11 PROCEDURE — 86850 RBC ANTIBODY SCREEN: CPT

## 2022-08-11 PROCEDURE — 85027 COMPLETE CBC AUTOMATED: CPT

## 2022-08-11 PROCEDURE — 71045 X-RAY EXAM CHEST 1 VIEW: CPT

## 2022-08-11 PROCEDURE — 84157 ASSAY OF PROTEIN OTHER: CPT

## 2022-08-11 PROCEDURE — 80053 COMPREHEN METABOLIC PANEL: CPT

## 2022-08-11 PROCEDURE — 82042 OTHER SOURCE ALBUMIN QUAN EA: CPT

## 2022-08-11 PROCEDURE — 85730 THROMBOPLASTIN TIME PARTIAL: CPT

## 2022-08-11 PROCEDURE — 86901 BLOOD TYPING SEROLOGIC RH(D): CPT

## 2022-08-11 PROCEDURE — 88112 CYTOPATH CELL ENHANCE TECH: CPT

## 2022-08-11 PROCEDURE — 87040 BLOOD CULTURE FOR BACTERIA: CPT

## 2022-08-11 PROCEDURE — 86900 BLOOD TYPING SEROLOGIC ABO: CPT

## 2022-08-11 PROCEDURE — 87070 CULTURE OTHR SPECIMN AEROBIC: CPT

## 2022-08-11 PROCEDURE — 84145 PROCALCITONIN (PCT): CPT

## 2022-08-11 PROCEDURE — 87205 SMEAR GRAM STAIN: CPT

## 2022-08-11 PROCEDURE — 83615 LACTATE (LD) (LDH) ENZYME: CPT

## 2022-08-11 PROCEDURE — 93005 ELECTROCARDIOGRAM TRACING: CPT

## 2022-08-11 PROCEDURE — 83986 ASSAY PH BODY FLUID NOS: CPT

## 2022-08-11 PROCEDURE — 88360 TUMOR IMMUNOHISTOCHEM/MANUAL: CPT

## 2022-08-11 PROCEDURE — 82945 GLUCOSE OTHER FLUID: CPT

## 2022-08-11 PROCEDURE — 84484 ASSAY OF TROPONIN QUANT: CPT

## 2022-08-11 PROCEDURE — 87102 FUNGUS ISOLATION CULTURE: CPT

## 2022-08-11 PROCEDURE — 87637 SARSCOV2&INF A&B&RSV AMP PRB: CPT

## 2022-08-11 PROCEDURE — 87075 CULTR BACTERIA EXCEPT BLOOD: CPT

## 2022-08-11 PROCEDURE — 88341 IMHCHEM/IMCYTCHM EA ADD ANTB: CPT

## 2022-08-11 PROCEDURE — 99285 EMERGENCY DEPT VISIT HI MDM: CPT

## 2022-08-11 PROCEDURE — 71275 CT ANGIOGRAPHY CHEST: CPT | Mod: MA

## 2022-08-11 PROCEDURE — 88342 IMHCHEM/IMCYTCHM 1ST ANTB: CPT

## 2022-08-11 PROCEDURE — 83735 ASSAY OF MAGNESIUM: CPT

## 2022-08-11 PROCEDURE — 71250 CT THORAX DX C-: CPT

## 2022-08-11 PROCEDURE — 88305 TISSUE EXAM BY PATHOLOGIST: CPT

## 2022-08-11 PROCEDURE — U0003: CPT

## 2022-08-11 PROCEDURE — 89051 BODY FLUID CELL COUNT: CPT

## 2022-12-18 ENCOUNTER — EMERGENCY (EMERGENCY)
Facility: HOSPITAL | Age: 37
LOS: 1 days | Discharge: HOME | End: 2022-12-18
Attending: EMERGENCY MEDICINE
Payer: MEDICAID

## 2022-12-18 VITALS
SYSTOLIC BLOOD PRESSURE: 161 MMHG | HEART RATE: 103 BPM | TEMPERATURE: 98 F | RESPIRATION RATE: 24 BRPM | DIASTOLIC BLOOD PRESSURE: 101 MMHG | OXYGEN SATURATION: 95 %

## 2022-12-18 VITALS
RESPIRATION RATE: 20 BRPM | SYSTOLIC BLOOD PRESSURE: 154 MMHG | OXYGEN SATURATION: 97 % | HEART RATE: 99 BPM | DIASTOLIC BLOOD PRESSURE: 108 MMHG

## 2022-12-18 DIAGNOSIS — Z98.890 OTHER SPECIFIED POSTPROCEDURAL STATES: Chronic | ICD-10-CM

## 2022-12-18 LAB
ALBUMIN SERPL ELPH-MCNC: 3.7 G/DL — SIGNIFICANT CHANGE UP (ref 3.3–5.2)
ALP SERPL-CCNC: 60 U/L — SIGNIFICANT CHANGE UP (ref 40–120)
ALT FLD-CCNC: 47 U/L — HIGH
ANION GAP SERPL CALC-SCNC: 10 MMOL/L — SIGNIFICANT CHANGE UP (ref 5–17)
AST SERPL-CCNC: 30 U/L — SIGNIFICANT CHANGE UP
BASE EXCESS BLDV CALC-SCNC: 2.3 MMOL/L — SIGNIFICANT CHANGE UP (ref -2–3)
BASOPHILS # BLD AUTO: 0.04 K/UL — SIGNIFICANT CHANGE UP (ref 0–0.2)
BASOPHILS NFR BLD AUTO: 0.6 % — SIGNIFICANT CHANGE UP (ref 0–2)
BILIRUB SERPL-MCNC: 0.2 MG/DL — LOW (ref 0.4–2)
BUN SERPL-MCNC: 14 MG/DL — SIGNIFICANT CHANGE UP (ref 8–20)
CA-I SERPL-SCNC: 1.17 MMOL/L — SIGNIFICANT CHANGE UP (ref 1.15–1.33)
CALCIUM SERPL-MCNC: 8.8 MG/DL — SIGNIFICANT CHANGE UP (ref 8.4–10.5)
CHLORIDE BLDV-SCNC: 105 MMOL/L — SIGNIFICANT CHANGE UP (ref 96–108)
CHLORIDE SERPL-SCNC: 103 MMOL/L — SIGNIFICANT CHANGE UP (ref 96–108)
CO2 SERPL-SCNC: 27 MMOL/L — SIGNIFICANT CHANGE UP (ref 22–29)
CREAT SERPL-MCNC: 0.74 MG/DL — SIGNIFICANT CHANGE UP (ref 0.5–1.3)
EGFR: 107 ML/MIN/1.73M2 — SIGNIFICANT CHANGE UP
EOSINOPHIL # BLD AUTO: 0.08 K/UL — SIGNIFICANT CHANGE UP (ref 0–0.5)
EOSINOPHIL NFR BLD AUTO: 1.2 % — SIGNIFICANT CHANGE UP (ref 0–6)
GAS PNL BLDV: 139 MMOL/L — SIGNIFICANT CHANGE UP (ref 136–145)
GAS PNL BLDV: SIGNIFICANT CHANGE UP
GLUCOSE BLDV-MCNC: 124 MG/DL — HIGH (ref 70–99)
GLUCOSE FLD-MCNC: 142 MG/DL — SIGNIFICANT CHANGE UP
GLUCOSE SERPL-MCNC: 140 MG/DL — HIGH (ref 70–99)
GRAM STN FLD: SIGNIFICANT CHANGE UP
HCG SERPL-ACNC: <4 MIU/ML — SIGNIFICANT CHANGE UP
HCO3 BLDV-SCNC: 28 MMOL/L — SIGNIFICANT CHANGE UP (ref 22–29)
HCT VFR BLD CALC: 33 % — LOW (ref 34.5–45)
HCT VFR BLDA CALC: 32 % — SIGNIFICANT CHANGE UP
HGB BLD CALC-MCNC: 10.8 G/DL — LOW (ref 11.7–16.1)
HGB BLD-MCNC: 10.6 G/DL — LOW (ref 11.5–15.5)
IMM GRANULOCYTES NFR BLD AUTO: 2 % — HIGH (ref 0–0.9)
LACTATE BLDV-MCNC: 1.1 MMOL/L — SIGNIFICANT CHANGE UP (ref 0.5–2)
LDH SERPL L TO P-CCNC: 85 U/L — SIGNIFICANT CHANGE UP
LYMPHOCYTES # BLD AUTO: 0.99 K/UL — LOW (ref 1–3.3)
LYMPHOCYTES # BLD AUTO: 14.9 % — SIGNIFICANT CHANGE UP (ref 13–44)
MCHC RBC-ENTMCNC: 29.3 PG — SIGNIFICANT CHANGE UP (ref 27–34)
MCHC RBC-ENTMCNC: 32.1 GM/DL — SIGNIFICANT CHANGE UP (ref 32–36)
MCV RBC AUTO: 91.2 FL — SIGNIFICANT CHANGE UP (ref 80–100)
MONOCYTES # BLD AUTO: 0.2 K/UL — SIGNIFICANT CHANGE UP (ref 0–0.9)
MONOCYTES NFR BLD AUTO: 3 % — SIGNIFICANT CHANGE UP (ref 2–14)
NEUTROPHILS # BLD AUTO: 5.22 K/UL — SIGNIFICANT CHANGE UP (ref 1.8–7.4)
NEUTROPHILS NFR BLD AUTO: 78.3 % — HIGH (ref 43–77)
NT-PROBNP SERPL-SCNC: 902 PG/ML — HIGH (ref 0–300)
PCO2 BLDV: 49 MMHG — HIGH (ref 39–42)
PH BLDV: 7.36 — SIGNIFICANT CHANGE UP (ref 7.32–7.43)
PH FLD: 7 — SIGNIFICANT CHANGE UP
PLATELET # BLD AUTO: 292 K/UL — SIGNIFICANT CHANGE UP (ref 150–400)
PO2 BLDV: 61 MMHG — HIGH (ref 25–45)
POTASSIUM BLDV-SCNC: 3.9 MMOL/L — SIGNIFICANT CHANGE UP (ref 3.5–5.1)
POTASSIUM SERPL-MCNC: 3.8 MMOL/L — SIGNIFICANT CHANGE UP (ref 3.5–5.3)
POTASSIUM SERPL-SCNC: 3.8 MMOL/L — SIGNIFICANT CHANGE UP (ref 3.5–5.3)
PROT FLD-MCNC: 2.5 G/DL — SIGNIFICANT CHANGE UP
PROT SERPL-MCNC: 6.6 G/DL — SIGNIFICANT CHANGE UP (ref 6.6–8.7)
RBC # BLD: 3.62 M/UL — LOW (ref 3.8–5.2)
RBC # FLD: 15.3 % — HIGH (ref 10.3–14.5)
SAO2 % BLDV: 92 % — SIGNIFICANT CHANGE UP
SODIUM SERPL-SCNC: 140 MMOL/L — SIGNIFICANT CHANGE UP (ref 135–145)
SPECIMEN SOURCE: SIGNIFICANT CHANGE UP
TROPONIN T SERPL-MCNC: <0.01 NG/ML — SIGNIFICANT CHANGE UP (ref 0–0.06)
WBC # BLD: 6.66 K/UL — SIGNIFICANT CHANGE UP (ref 3.8–10.5)
WBC # FLD AUTO: 6.66 K/UL — SIGNIFICANT CHANGE UP (ref 3.8–10.5)

## 2022-12-18 PROCEDURE — 83615 LACTATE (LD) (LDH) ENZYME: CPT

## 2022-12-18 PROCEDURE — 80053 COMPREHEN METABOLIC PANEL: CPT

## 2022-12-18 PROCEDURE — 84484 ASSAY OF TROPONIN QUANT: CPT

## 2022-12-18 PROCEDURE — 88112 CYTOPATH CELL ENHANCE TECH: CPT

## 2022-12-18 PROCEDURE — 82947 ASSAY GLUCOSE BLOOD QUANT: CPT

## 2022-12-18 PROCEDURE — 84157 ASSAY OF PROTEIN OTHER: CPT

## 2022-12-18 PROCEDURE — 82435 ASSAY OF BLOOD CHLORIDE: CPT

## 2022-12-18 PROCEDURE — 71250 CT THORAX DX C-: CPT | Mod: MA

## 2022-12-18 PROCEDURE — 71275 CT ANGIOGRAPHY CHEST: CPT | Mod: 26,MA

## 2022-12-18 PROCEDURE — 87205 SMEAR GRAM STAIN: CPT

## 2022-12-18 PROCEDURE — 85018 HEMOGLOBIN: CPT

## 2022-12-18 PROCEDURE — 71046 X-RAY EXAM CHEST 2 VIEWS: CPT

## 2022-12-18 PROCEDURE — 87070 CULTURE OTHR SPECIMN AEROBIC: CPT

## 2022-12-18 PROCEDURE — 82803 BLOOD GASES ANY COMBINATION: CPT

## 2022-12-18 PROCEDURE — 71275 CT ANGIOGRAPHY CHEST: CPT | Mod: MA

## 2022-12-18 PROCEDURE — 71046 X-RAY EXAM CHEST 2 VIEWS: CPT | Mod: 26

## 2022-12-18 PROCEDURE — 71045 X-RAY EXAM CHEST 1 VIEW: CPT | Mod: 26,59

## 2022-12-18 PROCEDURE — 87075 CULTR BACTERIA EXCEPT BLOOD: CPT

## 2022-12-18 PROCEDURE — 71250 CT THORAX DX C-: CPT | Mod: 26,MA,59

## 2022-12-18 PROCEDURE — 93010 ELECTROCARDIOGRAM REPORT: CPT

## 2022-12-18 PROCEDURE — 84702 CHORIONIC GONADOTROPIN TEST: CPT

## 2022-12-18 PROCEDURE — 82330 ASSAY OF CALCIUM: CPT

## 2022-12-18 PROCEDURE — 85025 COMPLETE CBC W/AUTO DIFF WBC: CPT

## 2022-12-18 PROCEDURE — 88305 TISSUE EXAM BY PATHOLOGIST: CPT

## 2022-12-18 PROCEDURE — 93005 ELECTROCARDIOGRAM TRACING: CPT

## 2022-12-18 PROCEDURE — 71045 X-RAY EXAM CHEST 1 VIEW: CPT

## 2022-12-18 PROCEDURE — 88305 TISSUE EXAM BY PATHOLOGIST: CPT | Mod: 26

## 2022-12-18 PROCEDURE — 83605 ASSAY OF LACTIC ACID: CPT

## 2022-12-18 PROCEDURE — 83986 ASSAY PH BODY FLUID NOS: CPT

## 2022-12-18 PROCEDURE — 99285 EMERGENCY DEPT VISIT HI MDM: CPT

## 2022-12-18 PROCEDURE — 84132 ASSAY OF SERUM POTASSIUM: CPT

## 2022-12-18 PROCEDURE — 88112 CYTOPATH CELL ENHANCE TECH: CPT | Mod: 26

## 2022-12-18 PROCEDURE — 36415 COLL VENOUS BLD VENIPUNCTURE: CPT

## 2022-12-18 PROCEDURE — 84295 ASSAY OF SERUM SODIUM: CPT

## 2022-12-18 PROCEDURE — 85014 HEMATOCRIT: CPT

## 2022-12-18 PROCEDURE — 83880 ASSAY OF NATRIURETIC PEPTIDE: CPT

## 2022-12-18 PROCEDURE — 89051 BODY FLUID CELL COUNT: CPT

## 2022-12-18 PROCEDURE — 82945 GLUCOSE OTHER FLUID: CPT

## 2022-12-18 NOTE — ED ADULT NURSE NOTE - OBJECTIVE STATEMENT
pt states she has a hx of pleural effusions, was feeling SOB earlier when she was getting ready for a party and thought she might have another. pt states she checked her pulse ox and it was always above 95%. pt oxygen saturation 99% on room air, denies SOB at this time. respirations even and unlabored.

## 2022-12-18 NOTE — CONSULT NOTE ADULT - SUBJECTIVE AND OBJECTIVE BOX
HPI: 37F with breast CA on chemo, history of right pleural effusion in July 2022 (over 4 liters drained), presents with new onset of SOB last night.  Relatively abrupt onset as she states she felt fine yesterday and even spent all day on her feet at work.  States the symptoms worsened with activity and recumbency.  Of note, she had recent covid infection beginning 3 weeks ago and feels she hasnt completely recovered with a lingering cough.  Denies chest pain, palpitations, lightheadedness, syncope, abdominal pain, back pain, fever, chills, N/V/D, Leg or calf pain, swelling, dysuria.  She currently feels better.  Non contrast CT Chest demonstrates moderate right sided partially loculated and mostly subpulmonic effusion, some mild atelectasis, no infiltrates.      PAST MEDICAL & SURGICAL HISTORY:  Breast cancer      Mild intermittent asthma      Status post cervical polyp removal          REVIEW OF SYSTEMS    10 system review negative except as noted in HPI    MEDICATIONS  (STANDING):    MEDICATIONS  (PRN):      Allergies    No Known Allergies    Intolerances        SOCIAL HISTORY:    FAMILY HISTORY:  FH: breast cancer  cousin        Vital Signs Last 24 Hrs  T(C): 36.7 (18 Dec 2022 07:12), Max: 36.7 (18 Dec 2022 02:33)  T(F): 98 (18 Dec 2022 07:12), Max: 98 (18 Dec 2022 02:33)  HR: 106 (18 Dec 2022 07:12) (103 - 106)  BP: 162/126 (18 Dec 2022 07:12) (161/101 - 162/126)  BP(mean): --  RR: 22 (18 Dec 2022 07:12) (22 - 24)  SpO2: 95% (18 Dec 2022 07:12) (95% - 95%)    Parameters below as of 18 Dec 2022 02:33  Patient On (Oxygen Delivery Method): room air        PHYSICAL EXAM:          LABS:                        10.6   6.66  )-----------( 292      ( 18 Dec 2022 04:12 )             33.0     12-18    140  |  103  |  14.0  ----------------------------<  140<H>  3.8   |  27.0  |  0.74    Ca    8.8      18 Dec 2022 04:12    TPro  6.6  /  Alb  3.7  /  TBili  0.2<L>  /  DBili  x   /  AST  30  /  ALT  47<H>  /  AlkPhos  60  12-18          RADIOLOGY & ADDITIONAL STUDIES:

## 2022-12-18 NOTE — CONSULT NOTE ADULT - ASSESSMENT
37y F with breast CA and pleural effusion, SOB  Suspect effusion unlikely cause of her symptoms due to size and degree of atelectasis.  Would proceed with CT Angio to r/o PE  Looks accessible on CT Scan, as discussed with patient, we will plan for thoracentesis prior to discharge if CTPA negative  Discussed with Dr. Gill

## 2022-12-18 NOTE — ED ADULT NURSE REASSESSMENT NOTE - NS ED NURSE REASSESS COMMENT FT1
Assumed care of pt at 0715 as stated in report from NATASHA Cuellar Charting as noted. Patient A&O x4, pt has mild pain/discomfort but is resolved with repositioning and sitting up in chair, denies CP/SOB. Updated on the plan of care. Call bell within reach, bed locked in lowest position. IV site flushed w/ NS. No redness, swelling or pain noted to site. No signs of acute distress noted, safety maintained. MD at bedside updating pt on plan of care.

## 2022-12-18 NOTE — ED ADULT TRIAGE NOTE - CHIEF COMPLAINT QUOTE
onset of shortness of breath tonight, active breast CA, on chemo, hx of pleural effusion, diminished lung sounds on right, Alert and oriented to person, place, and time, diagnosed with covid 19 3 weeks ago, hx of thoracostomy for same

## 2022-12-18 NOTE — ED PROVIDER NOTE - NSFOLLOWUPINSTRUCTIONS_ED_ALL_ED_FT
return to the ED if symptoms worsen, fever/chills, nausea/vomiting, chest pain, shortness of breath. follow up with your Cardiologist on Tuesday as scheduled.

## 2022-12-18 NOTE — ED PROVIDER NOTE - PHYSICAL EXAMINATION
Const:  Alert and interactive, no acute distress  HEENT: Normocephalic, atraumatic; TMs WNL; Moist mucosa; Oropharynx clear; Neck supple  Lymph: No significant lymphadenopathy  CV: Heart regular, normal S1/2, no murmurs; Extremities WWPx4  Pulm: right lung clear to ausculation, left side slightly diminished with crackles  GI: Abdomen non-distended; No organomegaly, no tenderness, no masses  Skin: No rash noted  Neuro: Alert; Normal tone; coordination appropriate for age PHYSICAL EXAM:   General: well-appearing, appears stated age, not in extremis   HEENT: NC/AT, airway patent  Cardiovascular: regular rate and rhythm, + S1/S2, no murmurs, rubs, gallops appreciated  Respiratory:  nonlabored respirations, some SOB with speaking, ?left sided diminished breath sounds with crackles  Extremities: no LE edema b/l.   Neuro: Alert and oriented x3. Moving all extremities.   Psychiatric: appropriate mood and affect.   -Kirstin Ring MD Attending Physician

## 2022-12-18 NOTE — ED PROVIDER NOTE - OBJECTIVE STATEMENT
38 y/o female with PMHx of Breast cancer on active chemo weekly last session on 12/16/22 presents to the ED c/o SOB. Pt states she had covid 3 weeks ago and feels that she never truly recovered. Pt notes today she developed SOB earlier today which she states feels similar to the start of her last pleural effusion. Pt also has dry cough associated with SOB. Pt denies chest pain, fevers, chills, N/V, calf pain, lower extremity edema.    [place] blood and cancer: Glenda

## 2022-12-18 NOTE — ED PROVIDER NOTE - NS ED ROS FT
REVIEW OF SYSTEMS:  General:  no fever, no chills  HEENT: no headache, no vision changes  Cardiac: no chest pain, no palpitations  Respiratory: + cough, + shortness of breath  Gastrointestinal: no abdominal pain, no nausea, no vomiting, no diarrhea, no melena, no hematochezia   Genitourinary: no hematuria, no dysuria, no urinary frequency, no urinary hesitancy, no vaginal bleeding or abnormal discharge  Extremities: no extremity swelling, no extremity pain  Neuro: no focal weakness, no numbness/tingling of the extremities, no decreased sensation  Heme: no easy bleeding, no easy bruising, no anemia  Skin: no abrasions, no jaundice, no pruritis, no rashes, no lesions  -Kirstin Ring MD Attending Physician REVIEW OF SYSTEMS:  General:  no fever, no chills  HEENT: no headache, no vision changes  Cardiac: no chest pain, no palpitations  Respiratory: + cough, + shortness of breath  Gastrointestinal: no abdominal pain, no nausea, no vomiting, no diarrhea,   Genitourinary: no hematuria, no dysuria, no urinary frequency, no urinary hesitancy  Extremities: no extremity swelling, no extremity pain  Neuro: no focal weakness, no numbness/tingling of the extremities, no decreased sensation  -Kirstin Ring MD Attending Physician

## 2022-12-18 NOTE — ED PROVIDER NOTE - PATIENT PORTAL LINK FT
You can access the FollowMyHealth Patient Portal offered by [facility] by registering at the following website: [email or web address]. By joining [employer]’s FollowMyHealth portal, you will also be able to view your health information using other applications (apps) compatible with our system.

## 2022-12-18 NOTE — ED PROVIDER NOTE - CLINICAL SUMMARY MEDICAL DECISION MAKING FREE TEXT BOX
hx and physical as noted above. ddx includes but not limited to recurrent pleural effusion vs bacterial pneumonia vs sequale from covid vs less likely PE. patient had CT chest which showed pleural effusion decreased in size. discussed risks/benefits of repeating CT with contrast to eval for PE ve inpatient for v/q scan and patient elected for CTA. patient signed out to dr dale

## 2022-12-18 NOTE — ED PROVIDER NOTE - PROGRESS NOTE DETAILS
chito: pt signed out by dr. medina pending cta to ro pe + pleural effusion ct surge consulted- [place] drain pleural effusion; pt already has been seeing a cardiologist and has cally apooitment with her cardiologist in 2 days; discussed all results with pt including bnp 900s - states that has been follwed with echos and EF was down few months ago to 25% due to chemo but went back up to 40% last month; offered to be evaluated by cardiology here  and would like to see her cardiologist on tuesday and is feeling better sp pleural ffusion drainage. will dc with return precautions; vs stable not hypoxic with ambulation

## 2022-12-18 NOTE — ED PROVIDER NOTE - CARE PLAN
1 Principal Discharge DX:	Shortness of breath   Principal Discharge DX:	Shortness of breath  Secondary Diagnosis:	Pleural effusion

## 2022-12-20 LAB — NON-GYNECOLOGICAL CYTOLOGY STUDY: SIGNIFICANT CHANGE UP

## 2022-12-23 LAB
CULTURE RESULTS: SIGNIFICANT CHANGE UP
SPECIMEN SOURCE: SIGNIFICANT CHANGE UP

## 2023-01-26 NOTE — CONSULT NOTE ADULT - PROBLEM SELECTOR RECOMMENDATION 2
New diagnosis of breast cancer this past week.   Pt reports that she had a breast biopsy yesterday and is awaiting pathology results for a tissue diagnosis. Abbe Flap (Lower To Upper Lip) Text: The defect of the upper lip was assessed and measured.  Given the location and size of the defect, an Abbe flap was deemed most appropriate.  Using a sterile surgical marker, an appropriate Abbe flap was measured and drawn on the lower lip. Local anesthesia was then infiltrated. A scalpel was then used to incise the upper lip through and through the skin, vermilion, muscle and mucosa, leaving the flap pedicled on the opposite side.  The flap was then rotated and transferred to the lower lip defect.  The flap was then sutured into place with a three layer technique, closing the orbicularis oris muscle layer with subcutaneous buried sutures, followed by a mucosal layer and an epidermal layer.

## 2023-02-26 NOTE — ED ADULT NURSE NOTE - IS THE PATIENT ABLE TO BE SCREENED?
Pt to ED per self. Pt reporting twitching in face starting earlier this week. Recently decreased synthroid medication to 100mcg from 112mcg. Pt reports taking 75mcg two days ago and not taking at all today. Now reporting left sided neck pain, chest pain, SOB. Yes

## 2023-06-13 NOTE — H&P PST ADULT - ENMT
Pt. A/O x4. VS except /66. Denies pain. NPO since midnight. SL. Hgb 7.5 and 7.7. No Bms this shift. Voiding adequately. Assist of 1 with W/GB. Will continue to monitor for any bloody stools and check hgb per orders.        details… detailed exam pharynx/mouth

## 2023-07-08 NOTE — ASU PATIENT PROFILE, ADULT - REASON FOR ADMISSION, PROFILE
Device: Loop reocrder, [employer]    Indication: presyncope  Interrogation complete    Battery: good  R wave: 0.75mV  Underlying Rhythm: SR    Events: None    Recommendations: Routine f/u as outpatient with Dr. Diggs    EPS 5717 Hysteroscopic Polypectomy Using Myosure

## 2023-12-30 NOTE — ED ADULT NURSE NOTE - CHPI ED NUR SYMPTOMS POS
PAST SURGICAL HISTORY:  H/O arthroscopy of knee June 2021    History of bilateral hip replacements     Removal of pin, plate, abigail, or screw 1991- removal of billings abigail    S/P cataract surgery     S/P dilatation and curettage 1981    S/P hip replacement left (2008)    S/P hysterectomy 1982    S/P repair of paraesophageal hernia 2001    S/P shoulder surgery right (2012)    S/P spinal fusion L4-L5 1966    S/P spinal surgery x 2 1985, 1986    Scoliosis s/p placement of billings abigail x 2 1984     SHORTNESS OF BREATH

## 2024-03-05 RX ORDER — ACETAMINOPHEN 500 MG
2 TABLET ORAL
Qty: 0 | Refills: 0 | DISCHARGE

## 2024-03-05 RX ORDER — CLONAZEPAM 1 MG
1 TABLET ORAL
Qty: 0 | Refills: 0 | DISCHARGE

## 2024-03-05 RX ORDER — ALBUTEROL 90 UG/1
2 AEROSOL, METERED ORAL
Qty: 0 | Refills: 0 | DISCHARGE

## 2024-03-05 RX ORDER — TRAMADOL HYDROCHLORIDE 50 MG/1
1 TABLET ORAL
Qty: 0 | Refills: 0 | DISCHARGE

## 2024-03-06 PROBLEM — J45.909 UNSPECIFIED ASTHMA, UNCOMPLICATED: Chronic | Status: ACTIVE | Noted: 2017-01-09

## 2024-03-06 PROBLEM — E28.2 POLYCYSTIC OVARIAN SYNDROME: Chronic | Status: ACTIVE | Noted: 2017-01-09

## 2024-03-06 PROBLEM — N84.0 POLYP OF CORPUS UTERI: Chronic | Status: ACTIVE | Noted: 2017-01-09

## 2024-05-29 NOTE — ASU PREOP CHECKLIST - SKIN PREP
[facility]  [facility] [address]  [address]  [place] [address]  Dept: [phone]  Dept Fax: [phone]  Loc: [phone]    Silvia Hughes is a 18 y.o. female who presents today for her medical conditions/complaints as noted below.  Silvia Hughes is complaining of Poison Ivy        HPI:   States she was out in the yard this weekend and pulled some poison ivy.    Rash  This is a new problem. The current episode started in the past 7 days. The problem is unchanged. The affected locations include the left hand. The rash is characterized by itchiness and burning. She was exposed to plant contact. Pertinent negatives include no congestion, cough, diarrhea, fatigue, fever, rhinorrhea, shortness of breath, sore throat or vomiting. Past treatments include anti-itch cream. The treatment provided mild relief.       History reviewed. No pertinent past medical history.    Past Surgical History:   Procedure Laterality Date    ADENOIDECTOMY      TONSILLECTOMY AND ADENOIDECTOMY      TYMPANOSTOMY TUBE PLACEMENT         Family History   Problem Relation Age of Onset    Depression Mother     Asthma Mother     Allergy (Severe) Mother     Other Mother         Rosey\"s    Depression Maternal Aunt     Depression Maternal Grandfather     High Blood Pressure Maternal Grandfather     Alcohol Abuse Maternal Grandfather     Early Death Maternal Grandfather     Seizures Paternal Grandmother     Depression Paternal Grandfather        Social History     Tobacco Use    Smoking status: Never    Smokeless tobacco: Never   Substance Use Topics    Alcohol use: No     Alcohol/week: 0.0 standard drinks of alcohol        Current Outpatient Medications   Medication Sig Dispense Refill    triamcinolone (KENALOG) 0.1 % cream Apply topically 2 times daily. 30 g 0    medroxyPROGESTERone (DEPO-PROVERA) 150 MG/ML injection Inject 1 mL into the muscle every 3 months 1 mL 3    citalopram (CELEXA) 20 MG tablet  n/a

## 2024-06-06 NOTE — CONSULT NOTE ADULT - CONSULT REASON
PLeural effusion Initiate Treatment: spironolactone 50 mg tablet Take 1 tablet PO QD\\ntretinoin 0.05 % topical cream Apply pea sized amount to entirety of face at bedtime, avoiding ocular skin around eyes Otc Regimen: Tinted Mineral Sunscreens Plan: AM:\\nStep 1: Cleanser\\nStep 2: Moisturizer \\nStep 3: Sunscreen\\n\\nPM: \\nStep 1: Cleanser\\nStep 2: Moisturizer \\nStep 3: tretinoin 0.05 % topical cream Apply pea sized amount to entirety of face at bedtime, avoiding ocular skin around eyes\\nStep 4: Moisturizer Detail Level: Zone Otc Regimen: SC A.G.E. Advanced Eye Cream

## 2024-10-14 NOTE — ED ADULT NURSE NOTE - PRIMARY CARE PROVIDER
Sw patient. Informed her Euflexxa inj are approved if she would like another set in 6 months.     10/3/2024.  EUFLEXXA () series of 3. NO PA REQUIRED REF# [id number].  RIGHT KNEE M17.11.  BUY AND BILL YES.  Per [employer] from INSURANCE.  
n/a